# Patient Record
Sex: FEMALE | Race: WHITE | NOT HISPANIC OR LATINO | Employment: STUDENT | URBAN - METROPOLITAN AREA
[De-identification: names, ages, dates, MRNs, and addresses within clinical notes are randomized per-mention and may not be internally consistent; named-entity substitution may affect disease eponyms.]

---

## 2017-03-27 ENCOUNTER — OFFICE VISIT (OUTPATIENT)
Dept: OBGYN | Age: 22
End: 2017-03-27

## 2017-03-27 VITALS — WEIGHT: 113.54 LBS | DIASTOLIC BLOOD PRESSURE: 70 MMHG | SYSTOLIC BLOOD PRESSURE: 88 MMHG

## 2017-03-27 DIAGNOSIS — Z30.430 ENCOUNTER FOR IUD INSERTION: ICD-10-CM

## 2017-03-27 DIAGNOSIS — Z11.3 SCREEN FOR STD (SEXUALLY TRANSMITTED DISEASE): ICD-10-CM

## 2017-03-27 DIAGNOSIS — Z01.812 PRE-PROCEDURE LAB EXAM: Primary | ICD-10-CM

## 2017-03-27 LAB
B-HCG UR QL: NORMAL
SP GR UR STRIP: NEGATIVE

## 2017-03-27 PROCEDURE — 87491 CHLMYD TRACH DNA AMP PROBE: CPT | Performed by: INTERNAL MEDICINE

## 2017-03-27 PROCEDURE — 58300 INSERT INTRAUTERINE DEVICE: CPT | Performed by: OBSTETRICS & GYNECOLOGY

## 2017-03-27 PROCEDURE — 81025 URINE PREGNANCY TEST: CPT | Performed by: OBSTETRICS & GYNECOLOGY

## 2017-03-27 PROCEDURE — 87591 N.GONORRHOEAE DNA AMP PROB: CPT | Performed by: INTERNAL MEDICINE

## 2017-03-28 ENCOUNTER — E-ADVICE (OUTPATIENT)
Dept: OBGYN | Age: 22
End: 2017-03-28

## 2017-03-28 LAB
C TRACH RRNA SPEC QL NAA+PROBE: NEGATIVE
N GONORRHOEA RRNA SPEC QL NAA+PROBE: NEGATIVE
SPECIMEN SOURCE: NORMAL

## 2017-03-31 ENCOUNTER — E-ADVICE (OUTPATIENT)
Dept: OBGYN | Age: 22
End: 2017-03-31

## 2017-03-31 ENCOUNTER — TELEPHONE (OUTPATIENT)
Dept: OBGYN | Age: 22
End: 2017-03-31

## 2017-04-03 ENCOUNTER — OFFICE VISIT (OUTPATIENT)
Dept: OBGYN | Age: 22
End: 2017-04-03

## 2017-04-03 VITALS
BODY MASS INDEX: 18.92 KG/M2 | HEIGHT: 64 IN | SYSTOLIC BLOOD PRESSURE: 110 MMHG | HEART RATE: 88 BPM | WEIGHT: 110.8 LBS | DIASTOLIC BLOOD PRESSURE: 68 MMHG

## 2017-04-03 DIAGNOSIS — L50.9 HIVES: ICD-10-CM

## 2017-04-03 DIAGNOSIS — L50.9 URTICARIA: ICD-10-CM

## 2017-04-03 DIAGNOSIS — Z30.431 IUD CHECK UP: Primary | ICD-10-CM

## 2017-04-03 PROCEDURE — 99213 OFFICE O/P EST LOW 20 MIN: CPT | Performed by: OBSTETRICS & GYNECOLOGY

## 2017-04-03 RX ORDER — METHYLPREDNISOLONE 4 MG/1
TABLET ORAL
Qty: 21 TABLET | Refills: 0 | Status: SHIPPED | OUTPATIENT
Start: 2017-04-03

## 2017-06-12 ENCOUNTER — TELEPHONE (OUTPATIENT)
Dept: ALLERGY | Age: 22
End: 2017-06-12

## 2017-06-20 ENCOUNTER — LAB SERVICES (OUTPATIENT)
Dept: LAB | Age: 22
End: 2017-06-20

## 2017-06-20 ENCOUNTER — OFFICE VISIT (OUTPATIENT)
Dept: ALLERGY | Age: 22
End: 2017-06-20
Attending: OBSTETRICS & GYNECOLOGY

## 2017-06-20 VITALS
RESPIRATION RATE: 14 BRPM | HEIGHT: 64 IN | HEART RATE: 88 BPM | SYSTOLIC BLOOD PRESSURE: 106 MMHG | BODY MASS INDEX: 17.93 KG/M2 | DIASTOLIC BLOOD PRESSURE: 62 MMHG | WEIGHT: 105 LBS

## 2017-06-20 DIAGNOSIS — L50.9 URTICARIA: ICD-10-CM

## 2017-06-20 DIAGNOSIS — L20.89 OTHER ATOPIC DERMATITIS: ICD-10-CM

## 2017-06-20 DIAGNOSIS — Z91.018 FOOD ALLERGY: ICD-10-CM

## 2017-06-20 DIAGNOSIS — L50.8 CHRONIC URTICARIA: ICD-10-CM

## 2017-06-20 DIAGNOSIS — L50.9 URTICARIA: Primary | ICD-10-CM

## 2017-06-20 DIAGNOSIS — L30.9 MODERATE ECZEMA: ICD-10-CM

## 2017-06-20 DIAGNOSIS — J30.81 ALLERGIC RHINITIS DUE TO ANIMAL (CAT) (DOG) HAIR AND DANDER: ICD-10-CM

## 2017-06-20 PROCEDURE — 86003 ALLG SPEC IGE CRUDE XTRC EA: CPT | Performed by: INTERNAL MEDICINE

## 2017-06-20 PROCEDURE — 99204 OFFICE O/P NEW MOD 45 MIN: CPT | Performed by: ALLERGY & IMMUNOLOGY

## 2017-06-20 PROCEDURE — 95004 PERQ TESTS W/ALRGNC XTRCS: CPT | Performed by: ALLERGY & IMMUNOLOGY

## 2017-06-20 PROCEDURE — 36415 COLL VENOUS BLD VENIPUNCTURE: CPT | Performed by: INTERNAL MEDICINE

## 2017-06-20 PROCEDURE — 83520 IMMUNOASSAY QUANT NOS NONAB: CPT | Performed by: INTERNAL MEDICINE

## 2017-06-20 RX ORDER — PIMECROLIMUS 10 MG/G
CREAM TOPICAL
Qty: 30 G | Refills: 0 | Status: SHIPPED | OUTPATIENT
Start: 2017-06-20 | End: 2017-10-27

## 2017-06-20 RX ORDER — COPPER 313.4 MG/1
INTRAUTERINE DEVICE INTRAUTERINE
COMMUNITY

## 2017-06-22 LAB
COW MILK IGE QN: <0.35 KU/L
COW WHEY IGE QN: <0.35 KU/L (ref 0–0.35)
DEPRECATED COW MILK IGE RAST QL: 0
DEPRECATED COW WHEY IGE RAST QL: 0
DEPRECATED GLUTEN IGE RAST QL: 0
DEPRECATED SOYBEAN IGE RAST QL: 0
DEPRECATED WHEAT IGE RAST QL: 0
GLUTEN IGE QN: <0.35 KU/L
SOYBEAN IGE QN: <0.35 KU/L
WHEAT IGE QN: <0.35 KU/L

## 2017-06-23 LAB
TRYPTASE SERPL-MCNC: 4.5 UG/L
URTICARIA-INDUCED BASOPHIL ACTIVATION (UIBA): 11

## 2017-07-05 ENCOUNTER — TELEPHONE (OUTPATIENT)
Dept: ALLERGY | Age: 22
End: 2017-07-05

## 2017-08-16 ENCOUNTER — E-ADVICE (OUTPATIENT)
Dept: FAMILY MEDICINE | Age: 22
End: 2017-08-16

## 2017-08-16 RX ORDER — CIPROFLOXACIN 500 MG/1
500 TABLET, FILM COATED ORAL EVERY 12 HOURS
Qty: 6 TABLET | Refills: 0 | Status: SHIPPED | OUTPATIENT
Start: 2017-08-16 | End: 2017-08-19

## 2017-08-18 ENCOUNTER — NURSE ONLY (OUTPATIENT)
Dept: FAMILY MEDICINE | Age: 22
End: 2017-08-18

## 2017-08-18 DIAGNOSIS — Z23 NEED FOR VACCINATION: Primary | ICD-10-CM

## 2017-08-18 PROCEDURE — 90636 HEP A/HEP B VACC ADULT IM: CPT | Performed by: NURSE PRACTITIONER

## 2017-08-18 PROCEDURE — 90471 IMMUNIZATION ADMIN: CPT | Performed by: NURSE PRACTITIONER

## 2017-10-13 ENCOUNTER — E-ADVICE (OUTPATIENT)
Dept: OBGYN | Age: 22
End: 2017-10-13

## 2017-10-27 ENCOUNTER — OFFICE VISIT (OUTPATIENT)
Dept: OBGYN | Age: 22
End: 2017-10-27

## 2017-10-27 VITALS
DIASTOLIC BLOOD PRESSURE: 56 MMHG | HEART RATE: 91 BPM | HEIGHT: 64 IN | BODY MASS INDEX: 19.2 KG/M2 | SYSTOLIC BLOOD PRESSURE: 100 MMHG | WEIGHT: 112.43 LBS

## 2017-10-27 DIAGNOSIS — Z30.432 ENCOUNTER FOR IUD REMOVAL: Primary | ICD-10-CM

## 2017-10-27 PROCEDURE — 58301 REMOVE INTRAUTERINE DEVICE: CPT | Performed by: OBSTETRICS & GYNECOLOGY

## 2017-11-29 ENCOUNTER — LAB SERVICES (OUTPATIENT)
Dept: LAB | Age: 22
End: 2017-11-29

## 2017-11-29 ENCOUNTER — OFFICE VISIT (OUTPATIENT)
Dept: FAMILY MEDICINE | Age: 22
End: 2017-11-29

## 2017-11-29 VITALS
DIASTOLIC BLOOD PRESSURE: 60 MMHG | RESPIRATION RATE: 20 BRPM | WEIGHT: 109 LBS | TEMPERATURE: 98.6 F | BODY MASS INDEX: 19.01 KG/M2 | OXYGEN SATURATION: 96 % | HEART RATE: 76 BPM | SYSTOLIC BLOOD PRESSURE: 90 MMHG

## 2017-11-29 DIAGNOSIS — R53.83 FATIGUE, UNSPECIFIED TYPE: ICD-10-CM

## 2017-11-29 DIAGNOSIS — R21 RASH: Primary | ICD-10-CM

## 2017-11-29 DIAGNOSIS — R21 RASH: ICD-10-CM

## 2017-11-29 DIAGNOSIS — R63.1 POLYDIPSIA: ICD-10-CM

## 2017-11-29 PROCEDURE — 80050 GENERAL HEALTH PANEL: CPT | Performed by: INTERNAL MEDICINE

## 2017-11-29 PROCEDURE — 99213 OFFICE O/P EST LOW 20 MIN: CPT | Performed by: NURSE PRACTITIONER

## 2017-11-29 PROCEDURE — 36415 COLL VENOUS BLD VENIPUNCTURE: CPT | Performed by: FAMILY MEDICINE

## 2017-11-29 PROCEDURE — 84439 ASSAY OF FREE THYROXINE: CPT | Performed by: INTERNAL MEDICINE

## 2017-11-29 RX ORDER — DEXTROAMPHETAMINE SACCHARATE, AMPHETAMINE ASPARTATE, DEXTROAMPHETAMINE SULFATE AND AMPHETAMINE SULFATE 5; 5; 5; 5 MG/1; MG/1; MG/1; MG/1
40 TABLET ORAL DAILY
COMMUNITY

## 2017-11-29 RX ORDER — AMOXICILLIN AND CLAVULANATE POTASSIUM 875; 125 MG/1; MG/1
1 TABLET, FILM COATED ORAL EVERY 12 HOURS
Qty: 20 TABLET | Refills: 0 | Status: SHIPPED | OUTPATIENT
Start: 2017-11-29

## 2017-11-30 ENCOUNTER — TELEPHONE (OUTPATIENT)
Dept: FAMILY MEDICINE | Age: 22
End: 2017-11-30

## 2017-11-30 DIAGNOSIS — Z13.29 SCREENING FOR THYROID DISORDER: Primary | ICD-10-CM

## 2017-11-30 LAB
ALBUMIN SERPL-MCNC: 3.8 G/DL (ref 3.6–5.1)
ALBUMIN/GLOB SERPL: 1.1 {RATIO} (ref 1–2.4)
ALP SERPL-CCNC: 80 UNITS/L (ref 45–117)
ALT SERPL-CCNC: 22 UNITS/L
ANION GAP SERPL CALC-SCNC: 13 MMOL/L (ref 10–20)
AST SERPL-CCNC: 22 UNITS/L
BASOPHILS # BLD AUTO: 0 K/MCL (ref 0–0.3)
BASOPHILS NFR BLD AUTO: 0 %
BILIRUB SERPL-MCNC: 0.3 MG/DL (ref 0.2–1)
BUN SERPL-MCNC: 13 MG/DL (ref 6–20)
BUN/CREAT SERPL: 20 (ref 7–25)
CALCIUM SERPL-MCNC: 9.6 MG/DL (ref 8.4–10.2)
CHLORIDE SERPL-SCNC: 104 MMOL/L (ref 98–107)
CO2 SERPL-SCNC: 28 MMOL/L (ref 21–32)
CREAT SERPL-MCNC: 0.64 MG/DL (ref 0.51–0.95)
DIFFERENTIAL METHOD BLD: NORMAL
EOSINOPHIL # BLD AUTO: 0.4 K/MCL (ref 0.1–0.5)
EOSINOPHIL NFR SPEC: 4 %
ERYTHROCYTE [DISTWIDTH] IN BLOOD: 12.5 % (ref 11–15)
FASTING STATUS PATIENT QL REPORTED: NORMAL HRS
GLOBULIN SER-MCNC: 3.5 G/DL (ref 2–4)
GLUCOSE SERPL-MCNC: 80 MG/DL (ref 65–99)
HCT VFR BLD CALC: 40.1 % (ref 36–46.5)
HGB BLD-MCNC: 13.7 G/DL (ref 12–15.5)
LYMPHOCYTES # BLD MANUAL: 1.7 K/MCL (ref 1–4.8)
LYMPHOCYTES NFR BLD MANUAL: 20 %
MCH RBC QN AUTO: 31.4 PG (ref 26–34)
MCHC RBC AUTO-ENTMCNC: 34.2 G/DL (ref 32–36.5)
MCV RBC AUTO: 91.8 FL (ref 78–100)
MONOCYTES # BLD MANUAL: 0.8 K/MCL (ref 0.3–0.9)
MONOCYTES NFR BLD MANUAL: 10 %
NEUTROPHILS # BLD: 5.4 K/MCL (ref 1.8–7.7)
NEUTROPHILS NFR BLD AUTO: 66 %
PLATELET # BLD: 349 K/MCL (ref 140–450)
POTASSIUM SERPL-SCNC: 4 MMOL/L (ref 3.4–5.1)
PROT SERPL-MCNC: 7.3 G/DL (ref 6.4–8.2)
RBC # BLD: 4.37 MIL/MCL (ref 4–5.2)
SODIUM SERPL-SCNC: 141 MMOL/L (ref 135–145)
T4 FREE SERPL-MCNC: 1.6 NG/DL (ref 0.8–1.5)
TSH SERPL-ACNC: 0.83 MCUNITS/ML (ref 0.35–5)
WBC # BLD: 8.3 K/MCL (ref 4.2–11)

## 2017-12-13 ENCOUNTER — E-ADVICE (OUTPATIENT)
Dept: FAMILY MEDICINE | Age: 22
End: 2017-12-13

## 2018-02-05 ENCOUNTER — TELEPHONE (OUTPATIENT)
Dept: FAMILY MEDICINE | Age: 23
End: 2018-02-05

## 2018-03-29 ENCOUNTER — E-ADVICE (OUTPATIENT)
Dept: FAMILY MEDICINE | Age: 23
End: 2018-03-29

## 2018-05-08 ENCOUNTER — TELEPHONE (OUTPATIENT)
Dept: FAMILY MEDICINE | Age: 23
End: 2018-05-08

## 2018-08-02 ENCOUNTER — TELEPHONE (OUTPATIENT)
Dept: FAMILY MEDICINE | Age: 23
End: 2018-08-02

## 2018-10-02 ENCOUNTER — OFFICE VISIT (OUTPATIENT)
Dept: ALLERGY | Age: 23
End: 2018-10-02

## 2018-10-02 VITALS
TEMPERATURE: 98 F | SYSTOLIC BLOOD PRESSURE: 108 MMHG | WEIGHT: 116.4 LBS | HEART RATE: 90 BPM | BODY MASS INDEX: 19.87 KG/M2 | HEIGHT: 64 IN | DIASTOLIC BLOOD PRESSURE: 70 MMHG

## 2018-10-02 DIAGNOSIS — L30.9 MODERATE ECZEMA: ICD-10-CM

## 2018-10-02 DIAGNOSIS — L50.8 CHRONIC URTICARIA: Primary | ICD-10-CM

## 2018-10-02 DIAGNOSIS — J30.81 ALLERGIC RHINITIS DUE TO ANIMAL (CAT) (DOG) HAIR AND DANDER: ICD-10-CM

## 2018-12-16 ENCOUNTER — WALK IN (OUTPATIENT)
Dept: URGENT CARE | Age: 23
End: 2018-12-16

## 2018-12-16 ENCOUNTER — NURSE TRIAGE (OUTPATIENT)
Dept: TELEHEALTH | Age: 23
End: 2018-12-16

## 2018-12-16 VITALS
TEMPERATURE: 98.4 F | SYSTOLIC BLOOD PRESSURE: 102 MMHG | OXYGEN SATURATION: 99 % | WEIGHT: 122.58 LBS | HEART RATE: 88 BPM | RESPIRATION RATE: 16 BRPM | DIASTOLIC BLOOD PRESSURE: 60 MMHG | BODY MASS INDEX: 20.93 KG/M2 | HEIGHT: 64 IN

## 2018-12-16 DIAGNOSIS — L30.9 ECZEMA, UNSPECIFIED TYPE: ICD-10-CM

## 2018-12-16 DIAGNOSIS — K60.2 FISSURE, ANAL: Primary | ICD-10-CM

## 2018-12-16 PROCEDURE — 99214 OFFICE O/P EST MOD 30 MIN: CPT | Performed by: FAMILY MEDICINE

## 2018-12-16 RX ORDER — LIDOCAINE 5 G/100G
CREAM RECTAL; TOPICAL PRN
Qty: 30 G | Refills: 0 | Status: SHIPPED | OUTPATIENT
Start: 2018-12-16

## 2018-12-16 RX ORDER — CLINDAMYCIN HYDROCHLORIDE 300 MG/1
300 CAPSULE ORAL 4 TIMES DAILY
Qty: 40 CAPSULE | Refills: 0 | Status: SHIPPED | OUTPATIENT
Start: 2018-12-16

## 2018-12-16 RX ORDER — TRIAMCINOLONE ACETONIDE OINTMENT USP, 0.05% 0.5 MG/G
OINTMENT TOPICAL
Qty: 1 TUBE | Refills: 0 | Status: SHIPPED | OUTPATIENT
Start: 2018-12-16

## 2018-12-16 RX ORDER — ALPRAZOLAM 0.5 MG/1
0.5 TABLET ORAL NIGHTLY PRN
COMMUNITY

## 2018-12-17 ENCOUNTER — TELEPHONE (OUTPATIENT)
Dept: SURGERY | Age: 23
End: 2018-12-17

## 2018-12-26 ENCOUNTER — APPOINTMENT (OUTPATIENT)
Dept: SURGERY | Age: 23
End: 2018-12-26
Attending: FAMILY MEDICINE

## 2019-02-04 ENCOUNTER — TELEPHONE (OUTPATIENT)
Dept: FAMILY MEDICINE | Age: 24
End: 2019-02-04

## 2019-02-04 RX ORDER — CETIRIZINE HYDROCHLORIDE 10 MG/1
10 TABLET ORAL
COMMUNITY
Start: 2018-08-01

## 2019-05-17 ENCOUNTER — E-ADVICE (OUTPATIENT)
Dept: OBGYN | Age: 24
End: 2019-05-17

## 2019-08-20 ENCOUNTER — TELEPHONE (OUTPATIENT)
Dept: FAMILY MEDICINE | Age: 24
End: 2019-08-20

## 2019-10-15 ENCOUNTER — TELEPHONE (OUTPATIENT)
Dept: FAMILY MEDICINE | Age: 24
End: 2019-10-15

## 2019-10-24 ENCOUNTER — TRANSFERRED RECORDS (OUTPATIENT)
Dept: HEALTH INFORMATION MANAGEMENT | Facility: CLINIC | Age: 24
End: 2019-10-24

## 2019-11-01 NOTE — TELEPHONE ENCOUNTER
FUTURE VISIT INFORMATION      FUTURE VISIT INFORMATION:    Date: 11.15.2019    Time: 12:00 P    Location: [facility]  REFERRAL INFORMATION:    Referring provider:  Lisseth Dunlap     Referring providers clinic:  [facility]    Reason for visit/diagnosis Patch testing consult, atopic dermatitis    RECORDS REQUESTED FROM:       Clinic name Comments Records Status Imaging Status   [facility] 10.24.19 Dr. Lisseth Dunlap Epic                                    Action    Action Taken 11.01.2019 Pending for referral and recs.

## 2019-11-15 ENCOUNTER — OFFICE VISIT (OUTPATIENT)
Dept: ALLERGY | Facility: CLINIC | Age: 24
End: 2019-11-15
Payer: COMMERCIAL

## 2019-11-15 ENCOUNTER — PRE VISIT (OUTPATIENT)
Dept: ALLERGY | Facility: CLINIC | Age: 24
End: 2019-11-15

## 2019-11-15 DIAGNOSIS — L23.89 ALLERGIC CONTACT DERMATITIS DUE TO OTHER AGENTS: ICD-10-CM

## 2019-11-15 DIAGNOSIS — H10.10 ALLERGIC RHINOCONJUNCTIVITIS: ICD-10-CM

## 2019-11-15 DIAGNOSIS — J30.9 ALLERGIC RHINOCONJUNCTIVITIS: ICD-10-CM

## 2019-11-15 DIAGNOSIS — Z88.9 ATOPY: Primary | ICD-10-CM

## 2019-11-15 ASSESSMENT — PAIN SCALES - GENERAL: PAINLEVEL: NO PAIN (0)

## 2019-11-15 NOTE — LETTER
11/15/2019         RE: Sayra Atkinson  [address]  [facility] [address]        Dear Colleague,    Thank you for referring your patient, Sayra Atkinson, to the [facility] ALLERGY. Please see a copy of my visit note below.    [facility] Allergy Note    Allergy Clinic  [facility]  [address] [place] [id number]    Encounter Date: Nov 15, 2019    CC:  Chief Complaint   Patient presents with     Allergies     Referral for patch testing.        History of Present Illness:  Ms. Sayra Atkinson is a 24 year old female who presents today for consultation for rash. The patient was referred by Lisseth Dunlap of [facility]. Pt reports that she has had eczema since childhood at about 4 years old. Mostly it appears as a rash on the face and upper extremities. Notes that it's also on the scalp. Worsened with a copper IUD placement about 3-4 years ago. Noted that the IUD was pure copper. She removed it. Then during the fall and while in large cities, her eczema will become quite severe.    She has exercise-induced asthma. Notes a family Hx of asthma. Was previously prescribed an inhaler which she hasn't had to use.  Reports having rhinitis with runny nose and red eyes when exposed to dogs, cats, and hamsters.    Admits to seasonal allergies to pollen worse in the fall and spring. Also states that she may be allergic to mold due to experiencing symptoms when visiting her parent's house which is quite old.    Had prick test done about 2-3 years ago and it was all negative. She uses sunscreen most of the time in the summer.     Notes she was prescribed dupixent about a month ago but still doesn't have it.    The patient showed me pictures from 2 years ago where there was a clear eczema in the decolletage, well-demarcated, on the neck, face and mostly around the eyes, and a little bit around the upper extremities, but really  almost nothing on the lower trunk.     She takes Adderall and xanax for school. Takes also multivitamins, probiotics, zinc, Chased Crow . Occasionally gets steroids injected to the neck and back. Takes antihistamines when she visits her parents in [place]. Denies having done IgA for gluten.    Pt is a  at the [employer]. 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 No hobbies.      Past Medical History:   There is no problem list on file for this patient.    No past medical history on file.  No past surgical history on file.    Social History:  Pt is a  at the [employer]. 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 No hobbies.    Family History:  No family history on file.    Medications:  No current outpatient medications on file.       Allergies not on file    Review of Systems:  -As per HPI  -Constitutional: Otherwise feeling well today, in usual state of health.  -Skin: As above in HPI. No additional skin concerns.    Physical exam:  Vitals: There were no vitals taken for this visit.  GEN: This is a well developed, well-nourished female in no acute distress, in a pleasant mood.    SKIN: Focused examination of the face and neck were performed.  - Face and neck clear.  -The patient showed pictures from 2 years ago where there was a clear eczema in the decolletage, well-demarcated, on the neck, face and mostly around the eyes, and a little bit around the upper extremities, but really almost nothing on the lower trunk.   -No other lesions of concern on areas examined.     Order for PATCH TESTS    [] Outpatient  [] Inpatient: [facility]..../ Bed ....      Skin Atopy (atopic dermatitis) [x] Yes   [] No  Rhinitis/Sinusitis:   [x] Yes   [] No  Allergic Asthma:   [x] Yes   [] No  Food Allergy:   [] Yes   [x] No no gluten (vomit), soy (throat closes, nauseous), quinoa (bloating)  Leg ulcers:   [] Yes   [x] No  Hand eczema:   [] Yes   [x] No   Leading hand:   [] R   [] L       []  Ambidextrous                      Reason for tests (suspected allergy): Recurrent eczemas mostly upper trunk and face and scalp  Known previous allergies: maybe metals, cobalt? Nickel?    Standardized panels  [x] Standard panel (40 tests)  [x] Preservatives & Antimicrobials (31 tests)  [x] Emulsifiers & Additives (25 tests)   [x] Perfumes/Flavours & Plants (25 tests)  [] Hairdresser panel (12 tests)  [] Rubber Chemicals (22 tests)  [] Plastics (26 tests)  [] Colorants/Dyes/Food additives (20 tests)  [] Metals (implants/dental) (24 tests)  [] Local anaesthetics/NSAIDs (13 tests)  [] Antibiotics & Antimycotics (14 tests)   [] Corticosteroids (15 tests)   [] Photopatch test (62 tests)   [] others: ...      [] Patient's own products: ...    DO NOT test if chemical or biological identity is unknown!     always ask from patient the product information and safety sheets (MSDS)   [x] Atopy screen prick test (Atopic predisposition): also add soy and wheat. Also intradermal test if everything is negative (immediate and delayed) to molds    [] Patient needs consultation with Allergy team (changes of tests may apply)  [] Tests discussed with Allergy team (can have direct appointment for patch tests)      Diagnosis:    >> Atopic predisposition with     atopic dermatitis since childhood    suspicion for allergic rhino-conjunctivitis    exercise-induced asthma with sensitization to animal dander, pollens, dust mites, molds?    >> Flare-ups of eczemas on face, scalp, wisam-orbital neck and decolletage and arms  DDx airborne contact dermatitis, photodermatitis? (more in winter), or atopic dermatitis with delayed type reactions to dust mites, molds or pollens      Treatment:    Plan for patch tests (see above)    Atopy screen prick test and Intradermal tests (molds and HDM)    CC Lisseth Dunlap MD  [facility]  [address]  [place] [address] on close of this encounter.    Follow-up for patch  testing.      Staff Involved:    Scribe Disclosure  I, Kathy Espinosa, am serving as a scribe to document services personally performed by Dr. Kranthi Johnson, based on data collection and the provider's statements to me.     I spent a total of 25 minutes face to face with Sayra Atkinson during today s office visit. Over 50% of this time was spent counseling the patient and/or coordinating care. Please see Assessment and Plan for details.        Again, thank you for allowing me to participate in the care of your patient.        Sincerely,        Kranthi Johnson MD

## 2019-11-15 NOTE — PROGRESS NOTES
[facility] Allergy Note    Allergy Clinic  [facility]  [address] [place] [address]    Encounter Date: Nov 15, 2019    CC:  Chief Complaint   Patient presents with     Allergies     Referral for patch testing.        History of Present Illness:  Ms. Sayra Atkinson is a 24 year old female who presents today for consultation for rash. The patient was referred by Lisseth Dunlap of [facility]. Pt reports that she has had eczema since childhood at about 4 years old. Mostly it appears as a rash on the face and upper extremities. Notes that it's also on the scalp. Worsened with a copper IUD placement about 3-4 years ago. Noted that the IUD was pure copper. She removed it. Then during the fall and while in large cities, her eczema will become quite severe.    She has exercise-induced asthma. Notes a family Hx of asthma. Was previously prescribed an inhaler which she hasn't had to use.  Reports having rhinitis with runny nose and red eyes when exposed to dogs, cats, and hamsters.    Admits to seasonal allergies to pollen worse in the fall and spring. Also states that she may be allergic to mold due to experiencing symptoms when visiting her parent's house which is quite old.    Had prick test done about 2-3 years ago and it was all negative. She uses sunscreen most of the time in the summer.     Notes she was prescribed dupixent about a month ago but still doesn't have it.    The patient showed me pictures from 2 years ago where there was a clear eczema in the decolletage, well-demarcated, on the neck, face and mostly around the eyes, and a little bit around the upper extremities, but really almost nothing on the lower trunk.     She takes Adderall and xanax for school. Takes also multivitamins, probiotics, zinc, Chased Crow . Occasionally gets steroids injected to the neck and back. Takes antihistamines when she visits her  parents in [place]. Denies having done IgA for gluten.    Pt is a  at the [employer]. 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 No hobbies.      Past Medical History:   There is no problem list on file for this patient.    No past medical history on file.  No past surgical history on file.    Social History:  Pt is a  at the [employer]. 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 No hobbies.    Family History:  No family history on file.    Medications:  No current outpatient medications on file.       Allergies not on file    Review of Systems:  -As per HPI  -Constitutional: Otherwise feeling well today, in usual state of health.  -Skin: As above in HPI. No additional skin concerns.    Physical exam:  Vitals: There were no vitals taken for this visit.  GEN: This is a well developed, well-nourished female in no acute distress, in a pleasant mood.    SKIN: Focused examination of the face and neck were performed.  - Face and neck clear.  -The patient showed pictures from 2 years ago where there was a clear eczema in the decolletage, well-demarcated, on the neck, face and mostly around the eyes, and a little bit around the upper extremities, but really almost nothing on the lower trunk.   -No other lesions of concern on areas examined.     Order for PATCH TESTS    [] Outpatient  [] Inpatient: [facility]..../ Bed ....      Skin Atopy (atopic dermatitis) [x] Yes   [] No  Rhinitis/Sinusitis:   [x] Yes   [] No  Allergic Asthma:   [x] Yes   [] No  Food Allergy:   [] Yes   [x] No no gluten (vomit), soy (throat closes, nauseous), quinoa (bloating)  Leg ulcers:   [] Yes   [x] No  Hand eczema:   [] Yes   [x] No   Leading hand:   [] R   [] L       [] Ambidextrous                      Reason for tests (suspected allergy): Recurrent eczemas mostly upper trunk and face and scalp  Known previous allergies: maybe metals, cobalt? Nickel?    Standardized panels  [x] Standard panel (40  tests)  [x] Preservatives & Antimicrobials (31 tests)  [x] Emulsifiers & Additives (25 tests)   [x] Perfumes/Flavours & Plants (25 tests)  [] Hairdresser panel (12 tests)  [] Rubber Chemicals (22 tests)  [] Plastics (26 tests)  [] Colorants/Dyes/Food additives (20 tests)  [] Metals (implants/dental) (24 tests)  [] Local anaesthetics/NSAIDs (13 tests)  [] Antibiotics & Antimycotics (14 tests)   [] Corticosteroids (15 tests)   [] Photopatch test (62 tests)   [] others: ...      [] Patient's own products: ...    DO NOT test if chemical or biological identity is unknown!     always ask from patient the product information and safety sheets (MSDS)   [x] Atopy screen prick test (Atopic predisposition): also add soy and wheat. Also intradermal test if everything is negative (immediate and delayed) to molds    [] Patient needs consultation with Allergy team (changes of tests may apply)  [] Tests discussed with Allergy team (can have direct appointment for patch tests)      Diagnosis:    >> Atopic predisposition with     atopic dermatitis since childhood    suspicion for allergic rhino-conjunctivitis    exercise-induced asthma with sensitization to animal dander, pollens, dust mites, molds?    >> Flare-ups of eczemas on face, scalp, wisam-orbital neck and decolletage and arms  DDx airborne contact dermatitis, photodermatitis? (more in winter), or atopic dermatitis with delayed type reactions to dust mites, molds or pollens      Treatment:    Plan for patch tests (see above)    Atopy screen prick test and Intradermal tests (molds and HDM)    CC Lisseth Dunlap MD  [facility]  [address]  [place] [address] on close of this encounter.    Follow-up for patch testing.      Staff Involved:    Scribe Disclosure  I, Kathy Espinosa, am serving as a scribe to document services personally performed by Dr. Kranthi Johnson, based on data collection and the provider's statements to me.     I spent a  total of 25 minutes face to face with Sayra Atkinson during today s office visit. Over 50% of this time was spent counseling the patient and/or coordinating care. Please see Assessment and Plan for details.

## 2019-11-15 NOTE — NURSING NOTE
"Chief Complaint   Patient presents with     Allergies     contact dermatitis, patient wants patch testing. Referred by dermatologist.  Patient's allergies  are based on symptoms, not testing. Patient had prick testing 2 years ago and it was negative stating \" a waste of time and money.\"  Patient states she has food sensitivities as well. Patient is not taking an antihistamine at this time.    .   Kaela Link LPN        "

## 2019-11-15 NOTE — PATIENT INSTRUCTIONS
Patch Testing Information  What are allergen patch tests?    The test is done to look for skin allergies that may be causing rashes and irritation.    A patch test is a way of identifying whether a substance has caused a delayed reaction with skin inflammation, such as contact eczema or delayed (after days) reactions to drugs.     We will use various types of test compounds, which may include common allergens you may come in contact with in daily life such as preservatives, fragrances or even drugs.     Most of the time we will use standardized, prefabricated test solutions. The choice of the substances and series tested will depend on your history of reactions. Sometimes we will test your own products as well.     In order to avoid severe toxic reactions we need detailed information or safety sheets for each of the test compounds.    The test panels are set up with a small amount of common substances that cause skin allergies. They are taped to your skin with a clear hypoallergenic bandage and reinforced hypoallergenic tape.    The substances are numbered, so it is easy to tell what is causing a skin reaction.  What do I need to do in preparation for the test?    Stop all systemic steroids 1 month prior to the testing.     Stop applying topical steroids to the test area one week prior to the test. It is to use them elsewhere throughout the testing process. If this is not possible then discuss options with the Allergy specialist.    Do not go sunbathing or tanning for one week prior to testing    It is okay to take antihistamines as normal.     Please wear dark colors the week of the test since we will write on you with a dark marker that may transfer and stain clothing or bedding.     Some medications can affect the reactions to allergens during the tests. Therefore reveal all the medications you take to the allergy team. The doctor will discuss the medications with you before the tests.     Eat how you normally  would.    Avoid the following:    You cannot get the test area wet during the entire test period. This means no bathing or swimming the entire test period.    No strenuous exercise that may cause sweating.    Do not scratch the test area, this can cause the allergen to spread and give us false positives.     Avoid exposure to UV irradiation. This means no tanning or UV treatment during the testing period.   What can I expect?    Patch testing is done over three appointments.   o The usual schedule is: Monday (Allergen patches are placed), Wednesday (Patches are removed and skin is examined by the MD), and Friday (Skin is examined by the MD)      If you are allergic, there will be an area of irritation where the test was placed.    Itching or burning at the test site might happen if you are allergic to the allergen.  Do not rub or scratch the test site since this may spread the allergen and possibly cause false positives. If itching or burning is not tolerable please contact the clinic.    The marker we draw on your back with ma take up to 5 weeks to wash off completely. Rubbing alcohol can help speed up this process.     Reactions can occur 1 to 2 weeks after the tests are applied. If this happens please take photos of the area and contact the clinic.  What should I do after the tests are placed?    Keep the area dry. No showering or getting the test area wet from the time we see you at your first visit until after your third appointment. If you get the test area wet you are washing off the test and we could get false negative reactions.    If you notice any of the test patches coming loose put on more tape to re-secure the test.    If the marker that is applied fades you can use a dark pen to noam around the panel sites.    Cover the test area when you are outside to avoid any sun exposure while the test is in place.     You can remove the tests only if there is a severe reaction (itch, pain, blistering). Please  report this to your doctor immediately. If you have to remove the tests please noam the locations of each test field with a grid so we can identify the allergen.  WHAT ARE THE POSSIBLE RISKS OF PATCH TESTS     If you are allergic to a compound tested you will develop a localized skin reaction similar to your previous reaction, this may take days to develop. These reactions include a formation of red, itchy skin lesions that could be about a centimeter with small vesicles or possibly even blisters. The lesions will fade over time, this may take weeks. The test might leave some skin pigmentation for a few months.     In rare cases a localized reaction to patch testing can become generalized.     The tests with your own products might have some risks because they are not standardized and unanticipated reactions could occur. We need as much information as possible to evaluate if your own products are safe to test and under what conditions. This has to be evaluated for each individual product.   Useful Contact Information    To contact your doctor you can either send a [employer] message or call [phone]    Address  o [address] [place] [address], [address]    If you develop any serious or adverse allergic reaction after office hours please seek immediate medical assistance at the nearest clinic, urgent care, or emergency room.

## 2019-12-02 ENCOUNTER — PRE VISIT (OUTPATIENT)
Dept: ALLERGY | Facility: CLINIC | Age: 24
End: 2019-12-02

## 2019-12-02 ENCOUNTER — OFFICE VISIT (OUTPATIENT)
Dept: ALLERGY | Facility: CLINIC | Age: 24
End: 2019-12-02
Payer: COMMERCIAL

## 2019-12-02 DIAGNOSIS — L23.89 ALLERGIC CONTACT DERMATITIS DUE TO OTHER AGENTS: Primary | ICD-10-CM

## 2019-12-02 DIAGNOSIS — Z88.9 ATOPY: ICD-10-CM

## 2019-12-02 DIAGNOSIS — J30.9 ALLERGIC RHINOCONJUNCTIVITIS: ICD-10-CM

## 2019-12-02 DIAGNOSIS — H10.10 ALLERGIC RHINOCONJUNCTIVITIS: ICD-10-CM

## 2019-12-02 ASSESSMENT — PAIN SCALES - GENERAL: PAINLEVEL: NO PAIN (0)

## 2019-12-02 NOTE — PROGRESS NOTES
[facility] Allergy Note    Allergy Clinic  [facility]  [address] [place] [address]    Encounter Date: Dec 2, 2019    CC:  Chief Complaint   Patient presents with     Allergy Testing Followup     Sayra is here for allergy testing day 1       History of Present Illness:  Ms. Sayra Atkinson is a 24 year old female who presents today for consultation for rash. The patient was referred by Lisseth Dunlap of [facility]. Pt reports that she has had eczema since childhood at about 4 years old. Mostly it appears as a rash on the face and upper extremities. Notes that it's also on the scalp. Worsened with a copper IUD placement about 3-4 years ago. Noted that the IUD was pure copper. She removed it. Then during the fall and while in large cities, her eczema will become quite severe.    She has exercise-induced asthma. Notes a family Hx of asthma. Was previously prescribed an inhaler which she hasn't had to use.  Reports having rhinitis with runny nose and red eyes when exposed to dogs, cats, and hamsters.    Admits to seasonal allergies to pollen worse in the fall and spring. Also states that she may be allergic to mold due to experiencing symptoms when visiting her parent's house which is quite old.    Had prick test done about 2-3 years ago and it was all negative. She uses sunscreen most of the time in the summer.     Notes she was prescribed dupixent about a month ago but still doesn't have it.    The patient showed me pictures from 2 years ago where there was a clear eczema in the decolletage, well-demarcated, on the neck, face and mostly around the eyes, and a little bit around the upper extremities, but really almost nothing on the lower trunk.     She takes Adderall and xanax for school. Takes also multivitamins, probiotics, zinc, Chased Crow . Occasionally gets steroids injected to the neck and back. Takes  antihistamines when she visits her parents in [place]. Denies having done IgA for gluten.    Pt is a  at the [employer]. 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 No hobbies.      Past Medical History:   There is no problem list on file for this patient.    No past medical history on file.  No past surgical history on file.    Social History:  Pt is a  at the [employer]. 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 No hobbies.    Family History:  No family history on file.    Medications:  No current outpatient medications on file.       Allergies   Allergen Reactions     Acetaminophen Shortness Of Breath     Ibuprofen Shortness Of Breath     Azithromycin Hives     Copper Hives       Review of Systems:  -As per HPI  -Constitutional: Otherwise feeling well today, in usual state of health.  -Skin: As above in HPI. No additional skin concerns.    Physical exam:  Vitals: There were no vitals taken for this visit.  GEN: This is a well developed, well-nourished female in no acute distress, in a pleasant mood.    SKIN: Focused examination of the face and neck were performed.  - Face and neck clear.  -The patient showed pictures from 2 years ago where there was a clear eczema in the decolletage, well-demarcated, on the neck, face and mostly around the eyes, and a little bit around the upper extremities, but really almost nothing on the lower trunk.   -No other lesions of concern on areas examined.     Order for PATCH TESTS    [] Outpatient  [] Inpatient: Garcia..../ Bed ....      Skin Atopy (atopic dermatitis) [x] Yes   [] No  Rhinitis/Sinusitis:   [x] Yes   [] No  Allergic Asthma:   [x] Yes   [] No  Food Allergy:   [] Yes   [x] No no gluten (vomit), soy (throat closes, nauseous), quinoa (bloating)  Leg ulcers:   [] Yes   [x] No  Hand eczema:   [] Yes   [x] No   Leading hand:   [] R   [] L       [] Ambidextrous                      Reason for tests (suspected allergy): Recurrent  eczemas mostly upper trunk and face and scalp  Known previous allergies: maybe metals, cobalt? Nickel?    Standardized panels  [x] Standard panel (40 tests)  [x] Preservatives & Antimicrobials (31 tests)  [x] Emulsifiers & Additives (25 tests)   [x] Perfumes/Flavours & Plants (25 tests)  [] Hairdresser panel (12 tests)  [] Rubber Chemicals (22 tests)  [] Plastics (26 tests)  [] Colorants/Dyes/Food additives (20 tests)  [] Metals (implants/dental) (24 tests)  [] Local anaesthetics/NSAIDs (13 tests)  [] Antibiotics & Antimycotics (14 tests)   [] Corticosteroids (15 tests)   [] Photopatch test (62 tests)   [] others: ...      [] Patient's own products: ...    DO NOT test if chemical or biological identity is unknown!     always ask from patient the product information and safety sheets (MSDS)   [x] Atopy screen prick test (Atopic predisposition): also add soy and wheat. Also intradermal test if everything is negative (immediate and delayed) to molds    [] Patient needs consultation with Allergy team (changes of tests may apply)  [] Tests discussed with Allergy team (can have direct appointment for patch tests)    Atopy Screen (Placed 12/02/19 )    No Substance Readings (15 min) Evaluation   POS Histamine 1mg/ml ++    NEG NaCl 0.9% -      No Substance Readings (15 min) Evaluation   1 Alternaria alternata (tenuis)  -    2 Cladosporium herbarum -    3 Aspergillus fumigatus -    4 Penicillium notatum -    5 Dermatophagoides pteronyssinus +++    6 Dermatophagoides farinae +++/++++    7 Dog epithelium (canis spp) -    8 Cat hair (jordan catus) +    9 Cockroach   (Blatella americana & germanica) -    10 Grass mix midwest   (Claudia, Orchard, Redtop, Aba) -    11 Herminio grass (sorghum halepense) +    12 Weed mix   (common Cocklebur, Lamb s quarters, rough redroot Pigweed, Dock/Sorrel) ++    13 Mug wort (artemisia vulgare) +    14 Ragweed giant/short (ambrosia spp) ++++    15 English Plantain (plantago lanceolata) +    16 Tree  mix 1 (Pecan, Maple BHR, Oak RVW, american Aurora, black London) +/++    17 Red cedar (juniperus virginia) -    18 Tree mix 2   (white Yoel, river/red Birch, black Braymer, common Snow Shoe, american Elm) +    19 Box elder/Maple mix (acer spp) ++    20 Hickory shagbark (carya ovata) +/++    21 Wheat -     22 Soy -      Conclusion: Patient has a massive reaction to house dust mites, much less to animal dander. In addition, she reacts to weed pollen and less to grass and tree pollen. This means patient is atopic.    RESULTS & EVALUATION of PATCH TESTS    Patch test readings after     [x] 2 days, [] 3 days [x] 4 days, [] 5 days,    Applied patch tests with results (import here the list of patch tests):  Order documented by: ANNA JADE LPN  Order reviewed by:  Physician:   Date/time of application:12-2-2019  Localization of application: back >> no lesions    STANDARD Series         No Substance 2 days 4 days remarks   1 Michael Mix [C] - -    2 Colophony - -    3  2-Mercaptobenzothiazole  - -     4 Methylisothiazolinone - -    5 Carba Mix - -    6 Thiuram Mix [A] NA NA    7 Bisphenol A Epoxy Resin - -    8 E-Djde-Qssddgalteb-Formaldehyde Resin - -    9 Mercapto Mix [A] - -    10 Black Rubber Mix- PPD [B] NA NA    11 Potassium Dichromate  -  -    12 Balsam of [place] (Myroxylon Pereirae Resin) - -    13 Nickel Sulphate Hexahydrate - -    14 Mixed Dialkyl Thiourea - -    15 Paraben Mix [B] - -    16 Methyldibromo Glutaronitrile - -    17 Fragrance Mix - -    18 2-Bromo-2-Nitropropane-1,3-Diol (Bronopol) - -    19 Lyral - -    20 Tixocortol-21- Pivalate - -    21 Diazolidiyl Urea (Germall II) - -    22 Methyl Methacrylate NA NA    23 Cobalt (II) Chloride Hexahydrate - -    24 Fragrance Mix II  - -    25 Compositae Mix - -    26 Benzoyl Peroxide - -    27 Bacitracin - -    28 Formaldehyde - -    29 Methylchloroisothiazolinone / Methylisothiazolinone - -    30 Corticosteroid Mix - -    31 Sodium Lauryl Sulfate - -     32 Lanolin Alcohol - -    33 Turpentine - -    34 Cetylstearylalcohol - -    35 Chlorhexidine Dicluconate - -    36 Budenoside - -    37 Imidazolidinyl Urea  - -    38 Ethyl-2 Cyanoacrylate NA NA    39 Quaternium 15 (Dowicil 200) - -    40 Decyl Glucoside - -      EMULSIFIERS & ADDITIVES        No Substance 2 days 4 days remarks   41 Polyethylene Glycol-400 NA NA    42 Cocamidopropyl Betaine - -    43 Amerchol L101 NA NA    44 Propylene Glycol - -    45 Triethanolamine - -    46 Sorbitane Sesquiolate - -    47 Isopropylmyristate - -    48 Polysorbate 80  - -    49 Amidoamine   (Stearamidopropyl Dimethylamine) - -    50 Oleamidopropyl Dimethylamine - -    51 Lauryl Glucoside - -    52 Coconut Diethanolamide  - -    53 2-Hydroxy-4-Methoxy Benzophenone (Oxybenzone) - -    54 Benzophenone-4 (Sulisobenzon) - -    55 Propolis - -    56 Dexpanthenol - -    57 Carboxymethyl Cellulose Sodium - -    58 Abitol - -    59 Tert-Butylhydroquinone - -    60 Benzyl Salicylate - -     Antioxidant      61 Dodecyl Gallate - -    62 Butylhydroxyanisole (BHA) - -    63 Butylhydroxytoluene (BHT) - -    64 Di-Alpha-Tocopherol (Vit E) - -    65 Propyl Gallate - -      PERFUMES, FLAVORS & PLANTS         No Substance 2 days 4 days remarks   66 Benzyl Salicylate - -    67 Benzyl Cinnamate - -    68 Di-Limonene (Dipentene) NA NA    69 Cananga Odorata (Giovanni Davila) (I) - -    70 Lichen Acid Mix - -    71 Mentha Piperita Oil (Peppermint Oil) - -    72 Sesquiterpenelactone mix - -    73 Tea Tree Oil, Oxidized - -    74 Wood Tar Mix - -    75 Abietic Acid NA NA    76 Lavendula Angustifolia Oil (Lavender Oil) - -    77 Camphor  NA NA     Fragrance Mix I      78 Oakmoss Absolute - -    79 Eugenol - -    80 Geraniol - -    81 Hydroxycitronellal - -    82 Isoeugenol - -    83 Cinnamic Aldehyde - -    84 Cinnamic Alcohol  - -    85 Sorbitane Sesquioleate NA NA     Fragrance mix II      86 Citronellol - -    87 Alpha-Hexylcinnamic Aldehyde    - -     88 Citral - -    89 Farnesol - -    90 Coumarin - -      PRESERVATIVES & ANTIMICROBIALS         No Substance 2 days 4 days remarks   91  1,2-Benzisothiazoline-3-One, Sodium Salt - -    92  1,3,5-Alec (2-Hydroxyethyl) - Hexahydrotriazine (Grotan BK) - -    93 9-Cfkphvyiwxjzy-6-Nitro-1, 3-Propanediol - -    94  3, 4, 4' - Triclocarban - -    95 4 - Chloro - 3 - Cresol - -    96 4 - Chloro - 4 - Xylenol (PCMX) - -    97 7-Ethylbicyclooxazolidine (Bioban BQ5730) - -    98 Benzalkonium Chloride - -    99 Benzyl Alcohol - -    100 Cetalkonium Chloride - -    101 Cetylpyrimidine Chloride  - -    102 Chloroacetamide - -    103 DMDM Hydantoin - -    104 Glutaraldehyde NA NA    105 Triclosan - -    106 Glyoxal Trimeric Dihydrate - -    107 Iodopropynyl Butylcarbamate - -    109 Octylisothiazoline - -    109 Iodoform - -    110 (Nitrobutyl) Morpholine/(Ethylnitro-Trimethylene) Dimorpholine (Bioban P 1487) - -    111 Phenoxyethanol - -    112 Phenyl Salicylate - -    113 Povidone Iodine - -    114 Sodium Benzoate - -    115 Sodium Disulfite NA NA    116 Sorbic Acid - -    117 Thimerosal - -     Parabens      118 Butyl-P-Hydroxybenzoate - -    119 Ethyl-P-Hydroxybenzoate - -    120 Methyl-P-Hydroxybenzoate NA NA    121 Propyl-P-Hydroxybenzoate - -       [] No relevant allergic reaction observed    [] Allergic reaction diagnosed against: see later      Interpretation/ remarks:   See later    [] Patient information given   [] ACDS CAMP information's (# ....) to following compounds: .....   [] General information's to following compounds: ......    ==> final Diagnosis:  See later    >> Atopic predisposition with     atopic dermatitis since childhood    Allergic rhinoconjunctivitis with relevant sensitization to dust mite and weed pollen, less grass and tree pollen    exercise-induced aggravation with asthma with sensitization to animal dander, pollens, dust mites, molds?    >> Flare-ups of eczemas on face, scalp, wisam-orbital  neck and decolletage and arms  DDx airborne contact dermatitis, photodermatitis? (more in winter), or atopic dermatitis with delayed type reactions to dust mites, molds or pollens    ==> Treatment prescribed/Plan:    If patch test on Wednesday negative, we will maybe do intradermal testing for molds    On Wednesday, will do delayed reading of prick test of dust mites (performed on 12/2/19)    These conclusions are made at the best of one's knowledge and belief based on the provided evidence such as patient's history and allergy test results and they can change over time or can be incomplete because of missing information's.      CC Lisseth Dunlap MD  [facility]  [address]  [place] [address] on close of this encounter.    Follow-up for ongoing patch testing.      Staff Involved:    Scribe Disclosure  I, Kathy Espinosa, am serving as a scribe to document services personally performed by Dr. Kranthi Johnson, based on data collection and the provider's statements to me.     I spent a total of 22 minutes face to face with Sayra Atkinson during today s office visit. Over 50% of this time was spent counseling the patient and/or coordinating care.  Please see Assessment and Plan for details.  This excludes any time spent performing prick and patch tests

## 2019-12-02 NOTE — LETTER
12/2/2019         RE: Sayra Atkinson  [address] [address]        Dear Colleague,    Thank you for referring your patient, Sayra Atkinson, to the [facility] ALLERGY. Please see a copy of my visit note below.    [facility] Allergy Note    Allergy Clinic  [facility]  [facility]  [address] [place] [id number]    Encounter Date: Dec 2, 2019    CC:  Chief Complaint   Patient presents with     Allergy Testing Followup     Sayra is here for allergy testing day 1       History of Present Illness:  Ms. Sayra Atkinson is a 24 year old female who presents today for consultation for rash. The patient was referred by Lisseth Dunlap of [facility]. Pt reports that she has had eczema since childhood at about 4 years old. Mostly it appears as a rash on the face and upper extremities. Notes that it's also on the scalp. Worsened with a copper IUD placement about 3-4 years ago. Noted that the IUD was pure copper. She removed it. Then during the fall and while in large cities, her eczema will become quite severe.    She has exercise-induced asthma. Notes a family Hx of asthma. Was previously prescribed an inhaler which she hasn't had to use.  Reports having rhinitis with runny nose and red eyes when exposed to dogs, cats, and hamsters.    Admits to seasonal allergies to pollen worse in the fall and spring. Also states that she may be allergic to mold due to experiencing symptoms when visiting her parent's house which is quite old.    Had prick test done about 2-3 years ago and it was all negative. She uses sunscreen most of the time in the summer.     Notes she was prescribed dupixent about a month ago but still doesn't have it.    The patient showed me pictures from 2 years ago where there was a clear eczema in the decolletage, well-demarcated, on the neck, face and mostly around the eyes, and a little bit around the upper  extremities, but really almost nothing on the lower trunk.     She takes Adderall and xanax for school. Takes also multivitamins, probiotics, zinc, Chased Crow . Occasionally gets steroids injected to the neck and back. Takes antihistamines when she visits her parents in [place]. Denies having done IgA for gluten.    Pt is a  at the [employer]. 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 No hobbies.      Past Medical History:   There is no problem list on file for this patient.    No past medical history on file.  No past surgical history on file.    Social History:  Pt is a  at the [employer]. 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 No hobbies.    Family History:  No family history on file.    Medications:  No current outpatient medications on file.       Allergies   Allergen Reactions     Acetaminophen Shortness Of Breath     Ibuprofen Shortness Of Breath     Azithromycin Hives     Copper Hives       Review of Systems:  -As per HPI  -Constitutional: Otherwise feeling well today, in usual state of health.  -Skin: As above in HPI. No additional skin concerns.    Physical exam:  Vitals: There were no vitals taken for this visit.  GEN: This is a well developed, well-nourished female in no acute distress, in a pleasant mood.    SKIN: Focused examination of the face and neck were performed.  - Face and neck clear.  -The patient showed pictures from 2 years ago where there was a clear eczema in the decolletage, well-demarcated, on the neck, face and mostly around the eyes, and a little bit around the upper extremities, but really almost nothing on the lower trunk.   -No other lesions of concern on areas examined.     Order for PATCH TESTS    [] Outpatient  [] Inpatient: [facility]..../ Bed ....      Skin Atopy (atopic dermatitis) [x] Yes   [] No  Rhinitis/Sinusitis:   [x] Yes   [] No  Allergic Asthma:   [x] Yes   [] No  Food Allergy:   [] Yes   [x] No no gluten (vomit), soy  (throat closes, nauseous), quinoa (bloating)  Leg ulcers:   [] Yes   [x] No  Hand eczema:   [] Yes   [x] No   Leading hand:   [] R   [] L       [] Ambidextrous                      Reason for tests (suspected allergy): Recurrent eczemas mostly upper trunk and face and scalp  Known previous allergies: maybe metals, cobalt? Nickel?    Standardized panels  [x] Standard panel (40 tests)  [x] Preservatives & Antimicrobials (31 tests)  [x] Emulsifiers & Additives (25 tests)   [x] Perfumes/Flavours & Plants (25 tests)  [] Hairdresser panel (12 tests)  [] Rubber Chemicals (22 tests)  [] Plastics (26 tests)  [] Colorants/Dyes/Food additives (20 tests)  [] Metals (implants/dental) (24 tests)  [] Local anaesthetics/NSAIDs (13 tests)  [] Antibiotics & Antimycotics (14 tests)   [] Corticosteroids (15 tests)   [] Photopatch test (62 tests)   [] others: ...      [] Patient's own products: ...    DO NOT test if chemical or biological identity is unknown!     always ask from patient the product information and safety sheets (MSDS)   [x] Atopy screen prick test (Atopic predisposition): also add soy and wheat. Also intradermal test if everything is negative (immediate and delayed) to molds    [] Patient needs consultation with Allergy team (changes of tests may apply)  [] Tests discussed with Allergy team (can have direct appointment for patch tests)    Atopy Screen (Placed 12/02/19 )    No Substance Readings (15 min) Evaluation   POS Histamine 1mg/ml ++    NEG NaCl 0.9% -      No Substance Readings (15 min) Evaluation   1 Alternaria alternata (tenuis)  -    2 Cladosporium herbarum -    3 Aspergillus fumigatus -    4 Penicillium notatum -    5 Dermatophagoides pteronyssinus +++    6 Dermatophagoides farinae +++/++++    7 Dog epithelium (canis spp) -    8 Cat hair (jordan catus) +    9 Cockroach   (Blatella americana & germanica) -    10 Grass mix midwest   (Claudia, Orchard, Redtop, Aba) -    11 Herminio grass (sorghum halepense) +     12 Weed mix   (common Cocklebur, Lamb s quarters, rough redroot Pigweed, Dock/Sorrel) ++    13 Mug wort (artemisia vulgare) +    14 Ragweed giant/short (ambrosia spp) ++++    15 English Plantain (plantago lanceolata) +    16 Tree mix 1 (Pecan, Maple BHR, Oak RVW, american [place], black [place]) +/++    17 Red cedar (juniperus virginia) -    18 Tree mix 2   (white Yoel, river/red Birch, black [place], common [place], american Elm) +    19 Box elder/Maple mix (acer spp) ++    20 Hickory shagbark (carya ovata) +/++    21 Wheat -     22 Soy -      Conclusion: Patient has a massive reaction to house dust mites, much less to animal dander. In addition, she reacts to weed pollen and less to grass and tree pollen. This means patient is atopic.    RESULTS & EVALUATION of PATCH TESTS    Patch test readings after     [x] 2 days, [] 3 days [x] 4 days, [] 5 days,    Applied patch tests with results (import here the list of patch tests):  Order documented by: ANNA JADE LPN  Order reviewed by:  Physician:   Date/time of application:12-2-2019  Localization of application: back >> no lesions    STANDARD Series         No Substance 2 days 4 days remarks   1 Michael Mix [C] - -    2 Colophony - -    3  2-Mercaptobenzothiazole  - -     4 Methylisothiazolinone - -    5 Carba Mix - -    6 Thiuram Mix [A] NA NA    7 Bisphenol A Epoxy Resin - -    8 Z-Zzyv-Eacpgxrnvfv-Formaldehyde Resin - -    9 Mercapto Mix [A] - -    10 Black Rubber Mix- PPD [B] NA NA    11 Potassium Dichromate  -  -    12 Balsam of [place] (Myroxylon Pereirae Resin) - -    13 Nickel Sulphate Hexahydrate - -    14 Mixed Dialkyl Thiourea - -    15 Paraben Mix [B] - -    16 Methyldibromo Glutaronitrile - -    17 Fragrance Mix - -    18 2-Bromo-2-Nitropropane-1,3-Diol (Bronopol) - -    19 Lyral - -    20 Tixocortol-21- Pivalate - -    21 Diazolidiyl Urea (Germall II) - -    22 Methyl Methacrylate NA NA    23 Cobalt (II) Chloride Hexahydrate - -    24  Fragrance Mix II  - -    25 Compositae Mix - -    26 Benzoyl Peroxide - -    27 Bacitracin - -    28 Formaldehyde - -    29 Methylchloroisothiazolinone / Methylisothiazolinone - -    30 Corticosteroid Mix - -    31 Sodium Lauryl Sulfate - -    32 Lanolin Alcohol - -    33 Turpentine - -    34 Cetylstearylalcohol - -    35 Chlorhexidine Dicluconate - -    36 Budenoside - -    37 Imidazolidinyl Urea  - -    38 Ethyl-2 Cyanoacrylate NA NA    39 Quaternium 15 (Dowicil 200) - -    40 Decyl Glucoside - -      EMULSIFIERS & ADDITIVES        No Substance 2 days 4 days remarks   41 Polyethylene Glycol-400 NA NA    42 Cocamidopropyl Betaine - -    43 Amerchol L101 NA NA    44 Propylene Glycol - -    45 Triethanolamine - -    46 Sorbitane Sesquiolate - -    47 Isopropylmyristate - -    48 Polysorbate 80  - -    49 Amidoamine   (Stearamidopropyl Dimethylamine) - -    50 Oleamidopropyl Dimethylamine - -    51 Lauryl Glucoside - -    52 Coconut Diethanolamide  - -    53 2-Hydroxy-4-Methoxy Benzophenone (Oxybenzone) - -    54 Benzophenone-4 (Sulisobenzon) - -    55 Propolis - -    56 Dexpanthenol - -    57 Carboxymethyl Cellulose Sodium - -    58 Abitol - -    59 Tert-Butylhydroquinone - -    60 Benzyl Salicylate - -     Antioxidant      61 Dodecyl Gallate - -    62 Butylhydroxyanisole (BHA) - -    63 Butylhydroxytoluene (BHT) - -    64 Di-Alpha-Tocopherol (Vit E) - -    65 Propyl Gallate - -      PERFUMES, FLAVORS & PLANTS         No Substance 2 days 4 days remarks   66 Benzyl Salicylate - -    67 Benzyl Cinnamate - -    68 Di-Limonene (Dipentene) NA NA    69 Cananga Odorata (Giovanni Davila) (I) - -    70 Lichen Acid Mix - -    71 Mentha Piperita Oil (Peppermint Oil) - -    72 Sesquiterpenelactone mix - -    73 Tea Tree Oil, Oxidized - -    74 Wood Tar Mix - -    75 Abietic Acid NA NA    76 Lavendula Angustifolia Oil (Lavender Oil) - -    77 Camphor  NA NA     Fragrance Mix I      78 Oakmoss Absolute - -    79 Eugenol - -    80  Geraniol - -    81 Hydroxycitronellal - -    82 Isoeugenol - -    83 Cinnamic Aldehyde - -    84 Cinnamic Alcohol  - -    85 Sorbitane Sesquioleate NA NA     Fragrance mix II      86 Citronellol - -    87 Alpha-Hexylcinnamic Aldehyde    - -    88 Citral - -    89 Farnesol - -    90 Coumarin - -      PRESERVATIVES & ANTIMICROBIALS         No Substance 2 days 4 days remarks   91  1,2-Benzisothiazoline-3-One, Sodium Salt - -    92  1,3,5-Aelc (2-Hydroxyethyl) - Hexahydrotriazine (Grotan BK) - -    93 1-Mpokjcknmwfbz-9-Nitro-1, 3-Propanediol - -    94  3, 4, 4' - Triclocarban - -    95 4 - Chloro - 3 - Cresol - -    96 4 - Chloro - 4 - Xylenol (PCMX) - -    97 7-Ethylbicyclooxazolidine (Send Word Nowan [id number]) - -    98 Benzalkonium Chloride - -    99 Benzyl Alcohol - -    100 Cetalkonium Chloride - -    101 Cetylpyrimidine Chloride  - -    102 Chloroacetamide - -    103 DMDM Hydantoin - -    104 Glutaraldehyde NA NA    105 Triclosan - -    106 Glyoxal Trimeric Dihydrate - -    107 Iodopropynyl Butylcarbamate - -    109 Octylisothiazoline - -    109 Iodoform - -    110 (Nitrobutyl) Morpholine/(Ethylnitro-Trimethylene) Dimorpholine (Bioban P 1487) - -    111 Phenoxyethanol - -    112 Phenyl Salicylate - -    113 Povidone Iodine - -    114 Sodium Benzoate - -    115 Sodium Disulfite NA NA    116 Sorbic Acid - -    117 Thimerosal - -     Parabens      118 Butyl-P-Hydroxybenzoate - -    119 Ethyl-P-Hydroxybenzoate - -    120 Methyl-P-Hydroxybenzoate NA NA    121 Propyl-P-Hydroxybenzoate - -       [] No relevant allergic reaction observed    [] Allergic reaction diagnosed against: see later      Interpretation/ remarks:   See later    [] Patient information given   [] ACDS CAMP information's (# ....) to following compounds: .....   [] General information's to following compounds: ......    ==> final Diagnosis:  See later    >> Atopic predisposition with     atopic dermatitis since childhood    Allergic rhinoconjunctivitis with  relevant sensitization to dust mite and weed pollen, less grass and tree pollen    exercise-induced aggravation with asthma with sensitization to animal dander, pollens, dust mites, molds?    >> Flare-ups of eczemas on face, scalp, wisam-orbital neck and decolletage and arms  DDx airborne contact dermatitis, photodermatitis? (more in winter), or atopic dermatitis with delayed type reactions to dust mites, molds or pollens    ==> Treatment prescribed/Plan:    If patch test on Wednesday negative, we will maybe do intradermal testing for molds    On Wednesday, will do delayed reading of prick test of dust mites (performed on 12/2/19)    These conclusions are made at the best of one's knowledge and belief based on the provided evidence such as patient's history and allergy test results and they can change over time or can be incomplete because of missing information's.      CC Lisseth Dunlap MD  [facility]  [address]  [place] [address] on close of this encounter.    Follow-up for ongoing patch testing.      Staff Involved:    Scribe Disclosure  I, Kathy Espinosa, am serving as a scribe to document services personally performed by Dr. Kranthi Johnson, based on data collection and the provider's statements to me.     I spent a total of 22 minutes face to face with Sayra Atkinson during today s office visit. Over 50% of this time was spent counseling the patient and/or coordinating care.  Please see Assessment and Plan for details.  This excludes any time spent performing prick and patch tests                    Again, thank you for allowing me to participate in the care of your patient.        Sincerely,        Kranthi Johnson MD

## 2019-12-02 NOTE — TELEPHONE ENCOUNTER
Order documented by: ANNA JADE LPN  Order reviewed by:  Physician:   Date/time of application:12-2-2019  Localization of application: back     STANDARD Series         No Substance 2 days 4 days remarks   1 Michael Mix [C] - -    2 Colophony - -    3  2-Mercaptobenzothiazole  - -     4 Methylisothiazolinone - -    5 Carba Mix - -    6 Thiuram Mix [A] - -    7 Bisphenol A Epoxy Resin - -    8 Z-Lsyc-Jwzyhectcuf-Formaldehyde Resin - -    9 Mercapto Mix [A] - -    10 Black Rubber Mix- PPD [B] - -    11 Potassium Dichromate  -  -    12 Balsam of [place] (Myroxylon Pereirae Resin) - -    13 Nickel Sulphate Hexahydrate - -    14 Mixed Dialkyl Thiourea - -    15 Paraben Mix [B] - -    16 Methyldibromo Glutaronitrile - -    17 Fragrance Mix - -    18 2-Bromo-2-Nitropropane-1,3-Diol (Bronopol) - -    19 Lyral - -    20 Tixocortol-21- Pivalate - -    21 Diazolidiyl Urea (Germall II) - -    22 Methyl Methacrylate - -    23 Cobalt (II) Chloride Hexahydrate - -    24 Fragrance Mix II  - -    25 Compositae Mix - -    26 Benzoyl Peroxide - -    27 Bacitracin - -    28 Formaldehyde - -    29 Methylchloroisothiazolinone / Methylisothiazolinone - -    30 Corticosteroid Mix - -    31 Sodium Lauryl Sulfate - -    32 Lanolin Alcohol - -    33 Turpentine - -    34 Cetylstearylalcohol - -    35 Chlorhexidine Dicluconate - -    36 Budenoside - -    37 Imidazolidinyl Urea  - -    38 Ethyl-2 Cyanoacrylate - -    39 Quaternium 15 (Dowicil 200) - -    40 Decyl Glucoside - -      EMULSIFIERS & ADDITIVES        No Substance 2 days 4 days remarks   41 Polyethylene Glycol-400 - -    42 Cocamidopropyl Betaine - -    43 Amerchol L101 - -    44 Propylene Glycol - -    45 Triethanolamine - -    46 Sorbitane Sesquiolate - -    47 Isopropylmyristate - -    48 Polysorbate 80  - -    49 Amidoamine   (Stearamidopropyl Dimethylamine) - -    50 Oleamidopropyl Dimethylamine - -    51 Lauryl Glucoside - -    52 Coconut Diethanolamide  - -    53  2-Hydroxy-4-Methoxy Benzophenone (Oxybenzone) - -    54 Benzophenone-4 (Sulisobenzon) - -    55 Propolis - -    56 Dexpanthenol - -    57 Carboxymethyl Cellulose Sodium - -    58 Abitol - -    59 Tert-Butylhydroquinone - -    60 Benzyl Salicylate - -     Antioxidant      61 Dodecyl Gallate - -    62 Butylhydroxyanisole (BHA) - -    63 Butylhydroxytoluene (BHT) - -    64 Di-Alpha-Tocopherol (Vit E) - -    65 Propyl Gallate - -      PERFUMES, FLAVORS & PLANTS         No Substance 2 days 4 days remarks   66 Benzyl Salicylate - -    67 Benzyl Cinnamate - -    68 Di-Limonene (Dipentene) - -    69 Cananga Odorata (Giovanni Davila) (I) - -    70 Lichen Acid Mix - -    71 Mentha Piperita Oil (Peppermint Oil) - -    72 Sesquiterpenelactone mix - -    73 Tea Tree Oil, Oxidized - -    74 Wood Tar Mix - -    75 Abietic Acid - -    76 Lavendula Angustifolia Oil (Lavender Oil) - -    77 Camphor  - -     Fragrance Mix I      78 Oakmoss Absolute - -    79 Eugenol - -    80 Geraniol - -    81 Hydroxycitronellal - -    82 Isoeugenol - -    83 Cinnamic Aldehyde - -    84 Cinnamic Alcohol  - -    85 Sorbitane Sesquioleate - -     Fragrance mix II      86 Citronellol - -    87 Alpha-Hexylcinnamic Aldehyde    - -    88 Citral - -    89 Farnesol - -    90 Coumarin - -      PRESERVATIVES & ANTIMICROBIALS         No Substance 2 days 4 days remarks   91  1,2-Benzisothiazoline-3-One, Sodium Salt - -    92  1,3,5-Alec (2-Hydroxyethyl) - Hexahydrotriazine (Grotan BK) - -    93 3-Ywjjncgtnhpdc-7-Nitro-1, 3-Propanediol - -    94  3, 4, 4' - Triclocarban - -    95 4 - Chloro - 3 - Cresol - -    96 4 - Chloro - 4 - Xylenol (PCMX) - -    97 7-Ethylbicyclooxazolidine (Bioban [id number]) - -    98 Benzalkonium Chloride - -    99 Benzyl Alcohol - -    100 Cetalkonium Chloride - -    101 Cetylpyrimidine Chloride  - -    102 Chloroacetamide - -    103 DMDM Hydantoin - -    104 Glutaraldehyde - -    105 Triclosan - -    106 Glyoxal Trimeric Dihydrate - -    107  Iodopropynyl Butylcarbamate - -    109 Octylisothiazoline - -    109 Iodoform - -    110 (Nitrobutyl) Morpholine/(Ethylnitro-Trimethylene) Dimorpholine (Bioban P 1487) - -    111 Phenoxyethanol - -    112 Phenyl Salicylate - -    113 Povidone Iodine - -    114 Sodium Benzoate - -    115 Sodium Disulfite - -    116 Sorbic Acid - -    117 Thimerosal - -     Parabens      118 Butyl-P-Hydroxybenzoate - -    119 Ethyl-P-Hydroxybenzoate - -    120 Methyl-P-Hydroxybenzoate - -    121 Propyl-P-Hydroxybenzoate - -

## 2019-12-02 NOTE — NURSING NOTE
Allergy Rooming Note    Sayra Atkinson's goals for this visit include:   Chief Complaint   Patient presents with     Allergy Testing Followup     Sayra is here for allergy testing day 1     Diana Hansen LPN

## 2019-12-04 ENCOUNTER — OFFICE VISIT (OUTPATIENT)
Dept: ALLERGY | Facility: CLINIC | Age: 24
End: 2019-12-04
Payer: COMMERCIAL

## 2019-12-04 DIAGNOSIS — L23.89 ALLERGIC CONTACT DERMATITIS DUE TO OTHER AGENTS: Primary | ICD-10-CM

## 2019-12-04 DIAGNOSIS — Z88.9 ATOPY: ICD-10-CM

## 2019-12-04 ASSESSMENT — PAIN SCALES - GENERAL: PAINLEVEL: NO PAIN (0)

## 2019-12-04 NOTE — LETTER
12/4/2019         RE: Sayra Atkinson  [address] [facility]  [address]  [facility] [address]        Dear Colleague,    Thank you for referring your patient, Sayra Atkinson, to the [facility] ALLERGY. Please see a copy of my visit note below.    [facility] Allergy Note    Allergy Clinic  [facility]  [address] [place] [id number]    Encounter Date: Dec 4, 2019    CC:  No chief complaint on file.      History of Present Illness:  Ms. Sayra Atkinson is a 24 year old female who presents today for consultation for rash. The patient was referred by Lisseth Dunlap of [facility]. Pt reports that she has had eczema since childhood at about 4 years old. Mostly it appears as a rash on the face and upper extremities. Notes that it's also on the scalp. Worsened with a copper IUD placement about 3-4 years ago. Noted that the IUD was pure copper. She removed it. Then during the fall and while in large cities, her eczema will become quite severe.    She has exercise-induced asthma. Notes a family Hx of asthma. Was previously prescribed an inhaler which she hasn't had to use.  Reports having rhinitis with runny nose and red eyes when exposed to dogs, cats, and hamsters.    Admits to seasonal allergies to pollen worse in the fall and spring. Also states that she may be allergic to mold due to experiencing symptoms when visiting her parent's house which is quite old.    Had prick test done about 2-3 years ago and it was all negative. She uses sunscreen most of the time in the summer.     Notes she was prescribed dupixent about a month ago but still doesn't have it.    The patient showed me pictures from 2 years ago where there was a clear eczema in the decolletage, well-demarcated, on the neck, face and mostly around the eyes, and a little bit around the upper extremities, but really almost nothing on the lower trunk.     She takes Adderall and  xanax for school. Takes also multivitamins, probiotics, zinc, Chased Crow . Occasionally gets steroids injected to the neck and back. Takes antihistamines when she visits her parents in [place]. Denies having done IgA for gluten.    Pt is a  at the [employer]. 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 No hobbies.      Past Medical History:   There is no problem list on file for this patient.    No past medical history on file.  No past surgical history on file.    Social History:  Pt is a  at the [employer]. 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 No hobbies.    Family History:  No family history on file.    Medications:  No current outpatient medications on file.       Allergies   Allergen Reactions     Acetaminophen Shortness Of Breath     Ibuprofen Shortness Of Breath     Azithromycin Hives     Copper Hives       Review of Systems:  -As per HPI  -Constitutional: Otherwise feeling well today, in usual state of health.  -Skin: As above in HPI. No additional skin concerns.    Physical exam:  Vitals: There were no vitals taken for this visit.  GEN: This is a well developed, well-nourished female in no acute distress, in a pleasant mood.    SKIN: Focused examination of the face and neck were performed.  - Face and neck clear.  -The patient showed pictures from 2 years ago where there was a clear eczema in the decolletage, well-demarcated, on the neck, face and mostly around the eyes, and a little bit around the upper extremities, but really almost nothing on the lower trunk.   -No other lesions of concern on areas examined.     Order for PATCH TESTS    [] Outpatient  [] Inpatient: Garcia..../ Bed ....      Skin Atopy (atopic dermatitis) [x] Yes   [] No  Rhinitis/Sinusitis:   [x] Yes   [] No  Allergic Asthma:   [x] Yes   [] No  Food Allergy:   [] Yes   [x] No no gluten (vomit), soy (throat closes, nauseous), quinoa (bloating)  Leg ulcers:   [] Yes   [x] No  Hand  eczema:   [] Yes   [x] No   Leading hand:   [] R   [] L       [] Ambidextrous                      Reason for tests (suspected allergy): Recurrent eczemas mostly upper trunk and face and scalp  Known previous allergies: maybe metals, cobalt? Nickel?    Standardized panels  [x] Standard panel (40 tests)  [x] Preservatives & Antimicrobials (31 tests)  [x] Emulsifiers & Additives (25 tests)   [x] Perfumes/Flavours & Plants (25 tests)  [] Hairdresser panel (12 tests)  [] Rubber Chemicals (22 tests)  [] Plastics (26 tests)  [] Colorants/Dyes/Food additives (20 tests)  [] Metals (implants/dental) (24 tests)  [] Local anaesthetics/NSAIDs (13 tests)  [] Antibiotics & Antimycotics (14 tests)   [] Corticosteroids (15 tests)   [] Photopatch test (62 tests)   [] others: ...      [] Patient's own products: ...    DO NOT test if chemical or biological identity is unknown!     always ask from patient the product information and safety sheets (MSDS)   [x] Atopy screen prick test (Atopic predisposition): also add soy and wheat. Also intradermal test if everything is negative (immediate and delayed) to molds    [] Patient needs consultation with Allergy team (changes of tests may apply)  [] Tests discussed with Allergy team (can have direct appointment for patch tests)    Atopy Screen (Placed 12/02/19 )    No Substance Readings (15 min) Evaluation   POS Histamine 1mg/ml ++    NEG NaCl 0.9% -      No Substance Readings (15 min) Evaluation   1 Alternaria alternata (tenuis)  -    2 Cladosporium herbarum -    3 Aspergillus fumigatus -    4 Penicillium notatum -    5 Dermatophagoides pteronyssinus +++    6 Dermatophagoides farinae +++/++++    7 Dog epithelium (canis spp) -    8 Cat hair (jordan catus) +    9 Cockroach   (Blatella americana & germanica) -    10 Grass mix midwest   (Claudia, Orchard, Redtop, Aba) -    11 Herminio grass (sorghum halepense) +    12 Weed mix   (common Cocklebur, Lamb s quarters, rough redroot Pigweed,  Dock/Sorrel) ++    13 Mug wort (artemisia vulgare) +    14 Ragweed giant/short (ambrosia spp) ++++    15 English Plantain (plantago lanceolata) +    16 Tree mix 1 (Pecan, Maple BHR, Oak RVW, american Irving, black Jackson) +/++    17 Red cedar (juniperus virginia) -    18 Tree mix 2   (white Yoel, river/red Birch, black North [place], common [place], american Elm) +    19 Box elder/Maple mix (acer spp) ++    20 Hickory shagbark (carya ovata) +/++    21 Wheat -     22 Soy -      Conclusion: Patient has a massive reaction to house dust mites, much less to animal dander. In addition, she reacts to weed pollen and less to grass and tree pollen. This means patient is atopic. Slight delayed reaction on 12/04/19 to D. Farinae.    RESULTS & EVALUATION of PATCH TESTS    Patch test readings after     [x] 2 days, [] 3 days [x] 4 days, [] 5 days,    Applied patch tests with results (import here the list of patch tests):  Order documented by: ANNA JADE LPN  Order reviewed by:  Physician:   Date/time of application:12-2-2019  Localization of application: back >> no lesions    STANDARD Series         No Substance 2 days 4 days remarks   1 Michael Mix [C] - -    2 Colophony - -    3  2-Mercaptobenzothiazole  - -     4 Methylisothiazolinone - -    5 Carba Mix - -    6 Thiuram Mix [A] NA NA    7 Bisphenol A Epoxy Resin - -    8 C-Zrhh-Diwqibdxmjb-Formaldehyde Resin - -    9 Mercapto Mix [A] - -    10 Black Rubber Mix- PPD [B] NA NA    11 Potassium Dichromate  -  -    12 Balsam of [place] (Myroxylon Pereirae Resin) - -    13 Nickel Sulphate Hexahydrate - -    14 Mixed Dialkyl Thiourea - -    15 Paraben Mix [B] - -    16 Methyldibromo Glutaronitrile - -    17 Fragrance Mix ++ -    18 2-Bromo-2-Nitropropane-1,3-Diol (Bronopol) - -    19 Lyral - -    20 Tixocortol-21- Pivalate - -    21 Diazolidiyl Urea (Germall II) - -    22 Methyl Methacrylate NA NA    23 Cobalt (II) Chloride Hexahydrate - -    24 Fragrance Mix II  - -    25  Compositae Mix - -    26 Benzoyl Peroxide (+) -    27 Bacitracin - -    28 Formaldehyde - -    29 Methylchloroisothiazolinone / Methylisothiazolinone - -    30 Corticosteroid Mix - -    31 Sodium Lauryl Sulfate - -    32 Lanolin Alcohol - -    33 Turpentine - -    34 Cetylstearylalcohol - -    35 Chlorhexidine Dicluconate - -    36 Budenoside - -    37 Imidazolidinyl Urea  - -    38 Ethyl-2 Cyanoacrylate NA NA    39 Quaternium 15 (Dowicil 200) - -    40 Decyl Glucoside - -      EMULSIFIERS & ADDITIVES        No Substance 2 days 4 days remarks   41 Polyethylene Glycol-400 NA NA    42 Cocamidopropyl Betaine - -    43 Amerchol L101 NA NA    44 Propylene Glycol - -    45 Triethanolamine - -    46 Sorbitane Sesquiolate - -    47 Isopropylmyristate - -    48 Polysorbate 80  - -    49 Amidoamine   (Stearamidopropyl Dimethylamine) - -    50 Oleamidopropyl Dimethylamine - -    51 Lauryl Glucoside - -    52 Coconut Diethanolamide  - -    53 2-Hydroxy-4-Methoxy Benzophenone (Oxybenzone) - -    54 Benzophenone-4 (Sulisobenzon) - -    55 Propolis - -    56 Dexpanthenol - -    57 Carboxymethyl Cellulose Sodium - -    58 Abitol - -    59 Tert-Butylhydroquinone - -    60 Benzyl Salicylate - -     Antioxidant      61 Dodecyl Gallate - -    62 Butylhydroxyanisole (BHA) - -    63 Butylhydroxytoluene (BHT) - -    64 Di-Alpha-Tocopherol (Vit E) - -    65 Propyl Gallate - -      PERFUMES, FLAVORS & PLANTS         No Substance 2 days 4 days remarks   66 Benzyl Salicylate - -    67 Benzyl Cinnamate - -    68 Di-Limonene (Dipentene) NA NA    69 Cananga Odorata (Giovanni Davila) (I) + -    70 Lichen Acid Mix - -    71 Mentha Piperita Oil (Peppermint Oil) - -    72 Sesquiterpenelactone mix - -    73 Tea Tree Oil, Oxidized - -    74 Wood Tar Mix (+) -    75 Abietic Acid NA NA    76 Lavendula Angustifolia Oil (Lavender Oil) - -    77 Camphor  NA NA     Fragrance Mix I      78 Oakmoss Absolute - -    79 Eugenol - -    80 Geraniol - -    81  Hydroxycitronellal - -    82 Isoeugenol ++ -    83 Cinnamic Aldehyde - -    84 Cinnamic Alcohol  - -    85 Sorbitane Sesquioleate NA NA     Fragrance mix II      86 Citronellol - -    87 Alpha-Hexylcinnamic Aldehyde    - -    88 Citral - -    89 Farnesol - -    90 Coumarin - -      PRESERVATIVES & ANTIMICROBIALS         No Substance 2 days 4 days remarks   91  1,2-Benzisothiazoline-3-One, Sodium Salt - -    92  1,3,5-Alec (2-Hydroxyethyl) - Hexahydrotriazine (Grotan BK) - -    93 9-Knipkhpmthqmz-8-Nitro-1, 3-Propanediol - -    94  3, 4, 4' - Triclocarban - -    95 4 - Chloro - 3 - Cresol - -    96 4 - Chloro - 4 - Xylenol (PCMX) - -    97 7-Ethylbicyclooxazolidine (Bioban [id number]) - -    98 Benzalkonium Chloride - -    99 Benzyl Alcohol - -    100 Cetalkonium Chloride - -    101 Cetylpyrimidine Chloride  - -    102 Chloroacetamide - -    103 DMDM Hydantoin - -    104 Glutaraldehyde NA NA    105 Triclosan - -    106 Glyoxal Trimeric Dihydrate - -    107 Iodopropynyl Butylcarbamate - -    109 Octylisothiazoline - -    109 Iodoform - -    110 (Nitrobutyl) Morpholine/(Ethylnitro-Trimethylene) Dimorpholine (Bioban P 1487) - -    111 Phenoxyethanol - -    112 Phenyl Salicylate - -    113 Povidone Iodine - -    114 Sodium Benzoate - -    115 Sodium Disulfite NA NA    116 Sorbic Acid - -    117 Thimerosal - -     Parabens      118 Butyl-P-Hydroxybenzoate - -    119 Ethyl-P-Hydroxybenzoate - -    120 Methyl-P-Hydroxybenzoate NA NA    121 Propyl-P-Hydroxybenzoate - -       [] No relevant allergic reaction observed    [x] Allergic reaction diagnosed against: fragrance mix I, in particular, isoeugenol; ylang ylang; wood tar mix      Interpretation/ remarks:   See later    [] Patient information given   [] ACDS CAMP information's (# ....) to following compounds: .....   [] General information's to following compounds: ......    ==> final Diagnosis:      >> Atopic predisposition with     atopic dermatitis since  childhood    Allergic rhinoconjunctivitis with relevant sensitization to dust mite and weed pollen, less grass and tree pollen    exercise-induced aggravation with asthma with sensitization to animal dander, pollens, dust mites, molds?    >> Flare-ups of eczemas on face, scalp, wisam-orbital neck and decolletage and arms with:    Relevant delayed allergic contact sensitization to fragrances, incl Isoeugenol, YlangYlang = airborne contact dermatitis    In prick tests, slight delayed reaction to D. farinae after 2 days = atopic dermatitis    ==> Treatment prescribed/Plan:    See later    These conclusions are made at the best of one's knowledge and belief based on the provided evidence such as patient's history and allergy test results and they can change over time or can be incomplete because of missing information's.      CC Lisseth Dunlap MD  [facility]  [address] [facility] [address]  [place] [address] on close of this encounter.    Follow-up for ongoing patch testing.    Scribe Disclosure  I, Kathy Odom, am serving as a scribe to document services personally performed by Dr. Kranthi Johnson MD, based on data collection and the provider's statements to me.    I spent a total of 15 minutes face to face with Sayra Atkinson during today s office visit. Over 50% of this time was spent counseling the patient and/or coordinating care. Please see Assessment and Plan for details.            Again, thank you for allowing me to participate in the care of your patient.        Sincerely,        Kranthi Johnson MD

## 2019-12-04 NOTE — PROGRESS NOTES
[facility] Allergy Note    Allergy Clinic  [facility]  [address] [place] [address]    Encounter Date: Dec 4, 2019    CC:  No chief complaint on file.      History of Present Illness:  Ms. Sayra Atkinson is a 24 year old female who presents today for consultation for rash. The patient was referred by Lisseth Dunlap of [facility]. Pt reports that she has had eczema since childhood at about 4 years old. Mostly it appears as a rash on the face and upper extremities. Notes that it's also on the scalp. Worsened with a copper IUD placement about 3-4 years ago. Noted that the IUD was pure copper. She removed it. Then during the fall and while in large cities, her eczema will become quite severe.    She has exercise-induced asthma. Notes a family Hx of asthma. Was previously prescribed an inhaler which she hasn't had to use.  Reports having rhinitis with runny nose and red eyes when exposed to dogs, cats, and hamsters.    Admits to seasonal allergies to pollen worse in the fall and spring. Also states that she may be allergic to mold due to experiencing symptoms when visiting her parent's house which is quite old.    Had prick test done about 2-3 years ago and it was all negative. She uses sunscreen most of the time in the summer.     Notes she was prescribed dupixent about a month ago but still doesn't have it.    The patient showed me pictures from 2 years ago where there was a clear eczema in the decolletage, well-demarcated, on the neck, face and mostly around the eyes, and a little bit around the upper extremities, but really almost nothing on the lower trunk.     She takes Adderall and xanax for school. Takes also multivitamins, probiotics, zinc, Chased Crow . Occasionally gets steroids injected to the neck and back. Takes antihistamines when she visits her parents in [place]. Denies having done IgA for gluten.    Pt is a   at the [employer]. 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 No hobbies.      Past Medical History:   There is no problem list on file for this patient.    No past medical history on file.  No past surgical history on file.    Social History:  Pt is a  at the [employer]. 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 No hobbies.    Family History:  No family history on file.    Medications:  No current outpatient medications on file.       Allergies   Allergen Reactions     Acetaminophen Shortness Of Breath     Ibuprofen Shortness Of Breath     Azithromycin Hives     Copper Hives       Review of Systems:  -As per HPI  -Constitutional: Otherwise feeling well today, in usual state of health.  -Skin: As above in HPI. No additional skin concerns.    Physical exam:  Vitals: There were no vitals taken for this visit.  GEN: This is a well developed, well-nourished female in no acute distress, in a pleasant mood.    SKIN: Focused examination of the face and neck were performed.  - Face and neck clear.  -The patient showed pictures from 2 years ago where there was a clear eczema in the decolletage, well-demarcated, on the neck, face and mostly around the eyes, and a little bit around the upper extremities, but really almost nothing on the lower trunk.   -No other lesions of concern on areas examined.     Order for PATCH TESTS    [] Outpatient  [] Inpatient: [facility]..../ Bed ....      Skin Atopy (atopic dermatitis) [x] Yes   [] No  Rhinitis/Sinusitis:   [x] Yes   [] No  Allergic Asthma:   [x] Yes   [] No  Food Allergy:   [] Yes   [x] No no gluten (vomit), soy (throat closes, nauseous), quinoa (bloating)  Leg ulcers:   [] Yes   [x] No  Hand eczema:   [] Yes   [x] No   Leading hand:   [] R   [] L       [] Ambidextrous                      Reason for tests (suspected allergy): Recurrent eczemas mostly upper trunk and face and scalp  Known previous allergies: maybe metals, cobalt?  Nickel?    Standardized panels  [x] Standard panel (40 tests)  [x] Preservatives & Antimicrobials (31 tests)  [x] Emulsifiers & Additives (25 tests)   [x] Perfumes/Flavours & Plants (25 tests)  [] Hairdresser panel (12 tests)  [] Rubber Chemicals (22 tests)  [] Plastics (26 tests)  [] Colorants/Dyes/Food additives (20 tests)  [] Metals (implants/dental) (24 tests)  [] Local anaesthetics/NSAIDs (13 tests)  [] Antibiotics & Antimycotics (14 tests)   [] Corticosteroids (15 tests)   [] Photopatch test (62 tests)   [] others: ...      [] Patient's own products: ...    DO NOT test if chemical or biological identity is unknown!     always ask from patient the product information and safety sheets (MSDS)   [x] Atopy screen prick test (Atopic predisposition): also add soy and wheat. Also intradermal test if everything is negative (immediate and delayed) to molds    [] Patient needs consultation with Allergy team (changes of tests may apply)  [] Tests discussed with Allergy team (can have direct appointment for patch tests)    Atopy Screen (Placed 12/02/19 )    No Substance Readings (15 min) Evaluation   POS Histamine 1mg/ml ++    NEG NaCl 0.9% -      No Substance Readings (15 min) Evaluation   1 Alternaria alternata (tenuis)  -    2 Cladosporium herbarum -    3 Aspergillus fumigatus -    4 Penicillium notatum -    5 Dermatophagoides pteronyssinus +++    6 Dermatophagoides farinae +++/++++    7 Dog epithelium (canis spp) -    8 Cat hair (jordan catus) +    9 Cockroach   (Blatella americana & germanica) -    10 Grass mix midwest   (Claudia, Orchard, Redtop, Aba) -    11 Herminio grass (sorghum halepense) +    12 Weed mix   (common Cocklebur, Lamb s quarters, rough redroot Pigweed, Dock/Sorrel) ++    13 Mug wort (artemisia vulgare) +    14 Ragweed giant/short (ambrosia spp) ++++    15 English Plantain (plantago lanceolata) +    16 Tree mix 1 (Pecan, Maple BHR, Oak RVW, american Yellow Jacket, black Jasper) +/++    17 Red cedar  (juniperus virginia) -    18 Tree mix 2   (white Yoel, river/red Birch, black [place], common Sevier, american Elm) +    19 Box elder/Maple mix (acer spp) ++    20 Hickory shagbark (carya ovata) +/++    21 Wheat -     22 Soy -      Conclusion: Patient has a massive reaction to house dust mites, much less to animal dander. In addition, she reacts to weed pollen and less to grass and tree pollen. This means patient is atopic. Slight delayed reaction on 12/04/19 to D. Farinae.    RESULTS & EVALUATION of PATCH TESTS    Patch test readings after     [x] 2 days, [] 3 days [x] 4 days, [] 5 days,    Applied patch tests with results (import here the list of patch tests):  Order documented by: ANNA JADE LPN  Order reviewed by:  Physician:   Date/time of application:12-2-2019  Localization of application: back >> no lesions    STANDARD Series         No Substance 2 days 4 days remarks   1 Michael Mix [C] - -    2 Colophony - -    3  2-Mercaptobenzothiazole  - -     4 Methylisothiazolinone - -    5 Carba Mix - -    6 Thiuram Mix [A] NA NA    7 Bisphenol A Epoxy Resin - -    8 Q-Dpwc-Xnvfjzwactx-Formaldehyde Resin - -    9 Mercapto Mix [A] - -    10 Black Rubber Mix- PPD [B] NA NA    11 Potassium Dichromate  -  -    12 Balsam of [place] (Myroxylon Pereirae Resin) - -    13 Nickel Sulphate Hexahydrate - -    14 Mixed Dialkyl Thiourea - -    15 Paraben Mix [B] - -    16 Methyldibromo Glutaronitrile - -    17 Fragrance Mix ++ -    18 2-Bromo-2-Nitropropane-1,3-Diol (Bronopol) - -    19 Lyral - -    20 Tixocortol-21- Pivalate - -    21 Diazolidiyl Urea (Germall II) - -    22 Methyl Methacrylate NA NA    23 Cobalt (II) Chloride Hexahydrate - -    24 Fragrance Mix II  - -    25 Compositae Mix - -    26 Benzoyl Peroxide (+) -    27 Bacitracin - -    28 Formaldehyde - -    29 Methylchloroisothiazolinone / Methylisothiazolinone - -    30 Corticosteroid Mix - -    31 Sodium Lauryl Sulfate - -    32 Lanolin Alcohol - -    33  Turpentine - -    34 Cetylstearylalcohol - -    35 Chlorhexidine Dicluconate - -    36 Budenoside - -    37 Imidazolidinyl Urea  - -    38 Ethyl-2 Cyanoacrylate NA NA    39 Quaternium 15 (Dowicil 200) - -    40 Decyl Glucoside - -      EMULSIFIERS & ADDITIVES        No Substance 2 days 4 days remarks   41 Polyethylene Glycol-400 NA NA    42 Cocamidopropyl Betaine - -    43 Amerchol L101 NA NA    44 Propylene Glycol - -    45 Triethanolamine - -    46 Sorbitane Sesquiolate - -    47 Isopropylmyristate - -    48 Polysorbate 80  - -    49 Amidoamine   (Stearamidopropyl Dimethylamine) - -    50 Oleamidopropyl Dimethylamine - -    51 Lauryl Glucoside - -    52 Coconut Diethanolamide  - -    53 2-Hydroxy-4-Methoxy Benzophenone (Oxybenzone) - -    54 Benzophenone-4 (Sulisobenzon) - -    55 Propolis - -    56 Dexpanthenol - -    57 Carboxymethyl Cellulose Sodium - -    58 Abitol - -    59 Tert-Butylhydroquinone - -    60 Benzyl Salicylate - -     Antioxidant      61 Dodecyl Gallate - -    62 Butylhydroxyanisole (BHA) - -    63 Butylhydroxytoluene (BHT) - -    64 Di-Alpha-Tocopherol (Vit E) - -    65 Propyl Gallate - -      PERFUMES, FLAVORS & PLANTS         No Substance 2 days 4 days remarks   66 Benzyl Salicylate - -    67 Benzyl Cinnamate - -    68 Di-Limonene (Dipentene) NA NA    69 Cananga Odorata (Giovanni Davila) (I) + -    70 Lichen Acid Mix - -    71 Mentha Piperita Oil (Peppermint Oil) - -    72 Sesquiterpenelactone mix - -    73 Tea Tree Oil, Oxidized - -    74 Wood Tar Mix (+) -    75 Abietic Acid NA NA    76 Lavendula Angustifolia Oil (Lavender Oil) - -    77 Camphor  NA NA     Fragrance Mix I      78 Oakmoss Absolute - -    79 Eugenol - -    80 Geraniol - -    81 Hydroxycitronellal - -    82 Isoeugenol ++ -    83 Cinnamic Aldehyde - -    84 Cinnamic Alcohol  - -    85 Sorbitane Sesquioleate NA NA     Fragrance mix II      86 Citronellol - -    87 Alpha-Hexylcinnamic Aldehyde    - -    88 Citral - -    89 Farnesol -  -    90 Coumarin - -      PRESERVATIVES & ANTIMICROBIALS         No Substance 2 days 4 days remarks   91  1,2-Benzisothiazoline-3-One, Sodium Salt - -    92  1,3,5-Alec (2-Hydroxyethyl) - Hexahydrotriazine (Grotan BK) - -    93 8-Bdfxvhpgdqswx-7-Nitro-1, 3-Propanediol - -    94  3, 4, 4' - Triclocarban - -    95 4 - Chloro - 3 - Cresol - -    96 4 - Chloro - 4 - Xylenol (PCMX) - -    97 7-Ethylbicyclooxazolidine (Bioban FL6977) - -    98 Benzalkonium Chloride - -    99 Benzyl Alcohol - -    100 Cetalkonium Chloride - -    101 Cetylpyrimidine Chloride  - -    102 Chloroacetamide - -    103 DMDM Hydantoin - -    104 Glutaraldehyde NA NA    105 Triclosan - -    106 Glyoxal Trimeric Dihydrate - -    107 Iodopropynyl Butylcarbamate - -    109 Octylisothiazoline - -    109 Iodoform - -    110 (Nitrobutyl) Morpholine/(Ethylnitro-Trimethylene) Dimorpholine (Ecolibrium Solaran P 1487) - -    111 Phenoxyethanol - -    112 Phenyl Salicylate - -    113 Povidone Iodine - -    114 Sodium Benzoate - -    115 Sodium Disulfite NA NA    116 Sorbic Acid - -    117 Thimerosal - -     Parabens      118 Butyl-P-Hydroxybenzoate - -    119 Ethyl-P-Hydroxybenzoate - -    120 Methyl-P-Hydroxybenzoate NA NA    121 Propyl-P-Hydroxybenzoate - -       [] No relevant allergic reaction observed    [x] Allergic reaction diagnosed against: fragrance mix I, in particular, isoeugenol; ylang ylang; wood tar mix      Interpretation/ remarks:   See later    [] Patient information given   [] ACDS CAMP information's (# ....) to following compounds: .....   [] General information's to following compounds: ......    ==> final Diagnosis:      >> Atopic predisposition with     atopic dermatitis since childhood    Allergic rhinoconjunctivitis with relevant sensitization to dust mite and weed pollen, less grass and tree pollen    exercise-induced aggravation with asthma with sensitization to animal dander, pollens, dust mites, molds?    >> Flare-ups of eczemas on face,  scalp, wisam-orbital neck and decolletage and arms with:    Relevant delayed allergic contact sensitization to fragrances, incl Isoeugenol, YlangYlang = airborne contact dermatitis    In prick tests, slight delayed reaction to D. farinae after 2 days = atopic dermatitis    ==> Treatment prescribed/Plan:    See later    These conclusions are made at the best of one's knowledge and belief based on the provided evidence such as patient's history and allergy test results and they can change over time or can be incomplete because of missing information's.      CC Lisseth Dunlap MD  [facility]  [address]  [place] [address] on close of this encounter.    Follow-up for ongoing patch testing.    Scribe Disclosure  I, Kathy Odom, am serving as a scribe to document services personally performed by Dr. Kranthi Johnson MD, based on data collection and the provider's statements to me.    I spent a total of 15 minutes face to face with Sayra Atkinson during today s office visit. Over 50% of this time was spent counseling the patient and/or coordinating care. Please see Assessment and Plan for details.

## 2019-12-04 NOTE — NURSING NOTE
Chief Complaint   Patient presents with     Allergy Testing Followup     Patch testing day #3     Kaela Link LPN

## 2019-12-06 ENCOUNTER — OFFICE VISIT (OUTPATIENT)
Dept: ALLERGY | Facility: CLINIC | Age: 24
End: 2019-12-06
Payer: COMMERCIAL

## 2019-12-06 DIAGNOSIS — L23.89 ALLERGIC CONTACT DERMATITIS DUE TO OTHER AGENTS: Primary | ICD-10-CM

## 2019-12-06 DIAGNOSIS — J30.9 ALLERGIC RHINOCONJUNCTIVITIS: ICD-10-CM

## 2019-12-06 DIAGNOSIS — H10.10 ALLERGIC RHINOCONJUNCTIVITIS: ICD-10-CM

## 2019-12-06 NOTE — PROGRESS NOTES
[facility] Allergy Note    Allergy Clinic  [facility]  [address] [place] [id number]    Encounter Date: Dec 6, 2019    CC:  Chief Complaint   Patient presents with     Allergy Testing Followup     Sayra is here today for Patch Testing Day 5.       History of Present Illness:  Ms. Sayra Atkinson is a 24 year old female who presents today for consultation for rash. The patient was referred by Lisseth Dunlap of [facility]. Pt reports that she has had eczema since childhood at about 4 years old. Mostly it appears as a rash on the face and upper extremities. Notes that it's also on the scalp. Worsened with a copper IUD placement about 3-4 years ago. Noted that the IUD was pure copper. She removed it. Then during the fall and while in large cities, her eczema will become quite severe.    She has exercise-induced asthma. Notes a family Hx of asthma. Was previously prescribed an inhaler which she hasn't had to use.  Reports having rhinitis with runny nose and red eyes when exposed to dogs, cats, and hamsters.    Admits to seasonal allergies to pollen worse in the fall and spring. Also states that she may be allergic to mold due to experiencing symptoms when visiting her parent's house which is quite old.    Had prick test done about 2-3 years ago and it was all negative. She uses sunscreen most of the time in the summer.     Notes she was prescribed dupixent about a month ago but still doesn't have it.    The patient showed me pictures from 2 years ago where there was a clear eczema in the decolletage, well-demarcated, on the neck, face and mostly around the eyes, and a little bit around the upper extremities, but really almost nothing on the lower trunk.     She takes Adderall and xanax for school. Takes also multivitamins, probiotics, zinc, Chased Crow . Occasionally gets steroids injected to the neck and back. Takes  antihistamines when she visits her parents in [place]. Denies having done IgA for gluten.    Pt is a  at the [employer]. 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 No hobbies.      Past Medical History:   There is no problem list on file for this patient.    No past medical history on file.  No past surgical history on file.    Social History:  Pt is a  at the [employer]. 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 No hobbies.    Family History:  No family history on file.    Medications:  No current outpatient medications on file.       Allergies   Allergen Reactions     Acetaminophen Shortness Of Breath     Ibuprofen Shortness Of Breath     Azithromycin Hives     Copper Hives       Review of Systems:  -As per HPI  -Constitutional: Otherwise feeling well today, in usual state of health.  -Skin: As above in HPI. No additional skin concerns.    Physical exam:  Vitals: There were no vitals taken for this visit.  GEN: This is a well developed, well-nourished female in no acute distress, in a pleasant mood.    SKIN: Focused examination of the face and neck were performed.  - Face and neck clear.  -The patient showed pictures from 2 years ago where there was a clear eczema in the decolletage, well-demarcated, on the neck, face and mostly around the eyes, and a little bit around the upper extremities, but really almost nothing on the lower trunk.   -No other lesions of concern on areas examined.     Order for PATCH TESTS    [] Outpatient  [] Inpatient: Garcia..../ Bed ....      Skin Atopy (atopic dermatitis) [x] Yes   [] No  Rhinitis/Sinusitis:   [x] Yes   [] No  Allergic Asthma:   [x] Yes   [] No  Food Allergy:   [] Yes   [x] No no gluten (vomit), soy (throat closes, nauseous), quinoa (bloating)  Leg ulcers:   [] Yes   [x] No  Hand eczema:   [] Yes   [x] No   Leading hand:   [] R   [] L       [] Ambidextrous                      Reason for tests (suspected allergy): Recurrent  eczemas mostly upper trunk and face and scalp  Known previous allergies: maybe metals, cobalt? Nickel?    Standardized panels  [x] Standard panel (40 tests)  [x] Preservatives & Antimicrobials (31 tests)  [x] Emulsifiers & Additives (25 tests)   [x] Perfumes/Flavours & Plants (25 tests)  [] Hairdresser panel (12 tests)  [] Rubber Chemicals (22 tests)  [] Plastics (26 tests)  [] Colorants/Dyes/Food additives (20 tests)  [] Metals (implants/dental) (24 tests)  [] Local anaesthetics/NSAIDs (13 tests)  [] Antibiotics & Antimycotics (14 tests)   [] Corticosteroids (15 tests)   [] Photopatch test (62 tests)   [] others: ...      [] Patient's own products: ...    DO NOT test if chemical or biological identity is unknown!     always ask from patient the product information and safety sheets (MSDS)   [x] Atopy screen prick test (Atopic predisposition): also add soy and wheat. Also intradermal test if everything is negative (immediate and delayed) to molds    [] Patient needs consultation with Allergy team (changes of tests may apply)  [] Tests discussed with Allergy team (can have direct appointment for patch tests)    Atopy Screen (Placed 12/02/19 )    No Substance Readings (15 min) Evaluation   POS Histamine 1mg/ml ++    NEG NaCl 0.9% -      No Substance Readings (15 min) Evaluation   1 Alternaria alternata (tenuis)  -    2 Cladosporium herbarum -    3 Aspergillus fumigatus -    4 Penicillium notatum -    5 Dermatophagoides pteronyssinus +++    6 Dermatophagoides farinae +++/++++    7 Dog epithelium (canis spp) -    8 Cat hair (jordan catus) +    9 Cockroach   (Blatella americana & germanica) -    10 Grass mix midwest   (Claudia, Orchard, Redtop, Aba) -    11 Herminio grass (sorghum halepense) +    12 Weed mix   (common Cocklebur, Lamb s quarters, rough redroot Pigweed, Dock/Sorrel) ++    13 Mug wort (artemisia vulgare) +    14 Ragweed giant/short (ambrosia spp) ++++    15 English Plantain (plantago lanceolata) +    16 Tree  mix 1 (Pecan, Maple BHR, Oak RVW, american Henderson, black Bailey) +/++    17 Red cedar (juniperus virginia) -    18 Tree mix 2   (white Yoel, river/red Birch, black Sardis, common [place], american Elm) +    19 Box elder/Maple mix (acer spp) ++    20 Hickory shagbark (carya ovata) +/++    21 Wheat -     22 Soy -      Conclusion: Patient has a massive reaction to house dust mites, much less to animal dander. In addition, she reacts to weed pollen and less to grass and tree pollen. This means patient is atopic. Slight delayed reaction on 12/04/19 to D. Farinae. And D. pteronyssinus    RESULTS & EVALUATION of PATCH TESTS    Patch test readings after     [x] 2 days, [] 3 days [x] 4 days, [] 5 days,    Applied patch tests with results (import here the list of patch tests):  Order documented by: ANNA JADE LPN  Order reviewed by:  Physician:   Date/time of application:12-2-2019  Localization of application: back >> no lesions    STANDARD Series         No Substance 2 days 4 days remarks   1 Michael Mix [C] - -    2 Colophony - -    3  2-Mercaptobenzothiazole  - -     4 Methylisothiazolinone - -    5 Carba Mix - -    6 Thiuram Mix [A] NA NA    7 Bisphenol A Epoxy Resin - -    8 V-Pqfx-Ltrjdcnjcwy-Formaldehyde Resin - -    9 Mercapto Mix [A] - -    10 Black Rubber Mix- PPD [B] NA NA    11 Potassium Dichromate  -  -    12 Balsam of [place] (Myroxylon Pereirae Resin) - -    13 Nickel Sulphate Hexahydrate - -    14 Mixed Dialkyl Thiourea - -    15 Paraben Mix [B] - -    16 Methyldibromo Glutaronitrile - -    17 Fragrance Mix ++ ++/+++    18 2-Bromo-2-Nitropropane-1,3-Diol (Bronopol) - -    19 Lyral - -    20 Tixocortol-21- Pivalate - -    21 Diazolidiyl Urea (Germall II) - -    22 Methyl Methacrylate NA NA    23 Cobalt (II) Chloride Hexahydrate - (+)    24 Fragrance Mix II  - -    25 Compositae Mix - -    26 Benzoyl Peroxide (+) -    27 Bacitracin - -    28 Formaldehyde - -    29 Methylchloroisothiazolinone /  Methylisothiazolinone - -    30 Corticosteroid Mix - -    31 Sodium Lauryl Sulfate - + Clearly irritant   32 Lanolin Alcohol - +/++    33 Turpentine - -    34 Cetylstearylalcohol - -    35 Chlorhexidine Dicluconate - -    36 Budenoside - -    37 Imidazolidinyl Urea  - -    38 Ethyl-2 Cyanoacrylate NA NA    39 Quaternium 15 (Dowicil 200) - -    40 Decyl Glucoside - -      EMULSIFIERS & ADDITIVES        No Substance 2 days 4 days remarks   41 Polyethylene Glycol-400 NA NA    42 Cocamidopropyl Betaine - -    43 Amerchol L101 NA NA    44 Propylene Glycol - -    45 Triethanolamine - -    46 Sorbitane Sesquiolate - -    47 Isopropylmyristate - -    48 Polysorbate 80  - -    49 Amidoamine   (Stearamidopropyl Dimethylamine) - -    50 Oleamidopropyl Dimethylamine - -    51 Lauryl Glucoside - -    52 Coconut Diethanolamide  - -    53 2-Hydroxy-4-Methoxy Benzophenone (Oxybenzone) - -    54 Benzophenone-4 (Sulisobenzon) - -    55 Propolis - -    56 Dexpanthenol - -    57 Carboxymethyl Cellulose Sodium - -    58 Abitol - -    59 Tert-Butylhydroquinone - -    60 Benzyl Salicylate - -     Antioxidant      61 Dodecyl Gallate - -    62 Butylhydroxyanisole (BHA) - -    63 Butylhydroxytoluene (BHT) - -    64 Di-Alpha-Tocopherol (Vit E) - -    65 Propyl Gallate - -      PERFUMES, FLAVORS & PLANTS         No Substance 2 days 4 days remarks   66 Benzyl Salicylate - -    67 Benzyl Cinnamate - -    68 Di-Limonene (Dipentene) NA NA    69 Cananga Odorata (Giovanni Davila) (I) + ++    70 Lichen Acid Mix - -    71 Mentha Piperita Oil (Peppermint Oil) - -    72 Sesquiterpenelactone mix - -    73 Tea Tree Oil, Oxidized - -    74 Wood Tar Mix (+) +/++    75 Abietic Acid NA NA    76 Lavendula Angustifolia Oil (Lavender Oil) - -    77 Camphor  NA NA     Fragrance Mix I      78 Oakmoss Absolute - -    79 Eugenol - -    80 Geraniol - -    81 Hydroxycitronellal - -    82 Isoeugenol ++ ++/+++    83 Cinnamic Aldehyde - -    84 Cinnamic Alcohol  - -    85  Sorbitane Sesquioleate NA NA     Fragrance mix II      86 Citronellol - -    87 Alpha-Hexylcinnamic Aldehyde    - -    88 Citral - -    89 Farnesol - -    90 Coumarin - -      PRESERVATIVES & ANTIMICROBIALS         No Substance 2 days 4 days remarks   91  1,2-Benzisothiazoline-3-One, Sodium Salt - -    92  1,3,5-Alec (2-Hydroxyethyl) - Hexahydrotriazine (Grotan BK) - -    93 3-Iiuirsilkwnik-0-Nitro-1, 3-Propanediol - -    94  3, 4, 4' - Triclocarban - -    95 4 - Chloro - 3 - Cresol - -    96 4 - Chloro - 4 - Xylenol (PCMX) - -    97 7-Ethylbicyclooxazolidine (MerchantCirclean [id number]) - -    98 Benzalkonium Chloride - -    99 Benzyl Alcohol - -    100 Cetalkonium Chloride - -    101 Cetylpyrimidine Chloride  - -    102 Chloroacetamide - -    103 DMDM Hydantoin - -    104 Glutaraldehyde NA NA    105 Triclosan - -    106 Glyoxal Trimeric Dihydrate - -    107 Iodopropynyl Butylcarbamate - -    109 Octylisothiazoline - -    109 Iodoform - -    110 (Nitrobutyl) Morpholine/(Ethylnitro-Trimethylene) Dimorpholine (Bioban P 1487) - -    111 Phenoxyethanol - -    112 Phenyl Salicylate - -    113 Povidone Iodine - -    114 Sodium Benzoate - -    115 Sodium Disulfite NA NA    116 Sorbic Acid - -    117 Thimerosal - -     Parabens      118 Butyl-P-Hydroxybenzoate - -    119 Ethyl-P-Hydroxybenzoate - -    120 Methyl-P-Hydroxybenzoate NA NA    121 Propyl-P-Hydroxybenzoate - -      [x] Allergic reaction diagnosed against:   Standard:  Fragrance Mix +/++  Lanolin Alcohol +    Perfumes, flavors, and plants:  Cananga Odorata (Giovanni Davila) (I) ++  Wood Tar Mix +/++  Isoeugenol ++/+++      Interpretation/ remarks:   Patient has a clinically relevant contact sensitization to fragrance and similar products (isoeugenol and wood tar mix). In addition, there is a slight reaction to the metal cobalt and to the lanolin. However, the atopic skin is hyperirritable; that has been shown by the slightly positive reaction to sodium lauryl sulfate and  therefore not totally sure.    [x] Patient information given   [x] ACDS CAMP information's (# [id number], and [id number]) to following compounds: Fragrance Mix +/++, Lanolin Alcohol +, Cananga Odorata (Ylang Ylang) (I) ++, Isoeugenol ++/+++. CAMP marissa installed in the clinic.    ==> final Diagnosis:    >> Atopic predisposition with     atopic dermatitis since childhood    Allergic rhinoconjunctivitis with relevant sensitization to dust mite and weed pollen, less grass and tree pollen    exercise-induced aggravation with asthma with sensitization to animal dander, pollens, dust mites, molds?    >> Flare-ups of eczemas on face, scalp, wisam-orbital neck and decolletage and arms with:    Relevant delayed allergic contact sensitization to fragrances, incl Isoeugenol, YlangYlang = airborne contact dermatitis    In prick tests, slight delayed reaction to D. farinae after 2 days = atopic dermatitis    ==> Treatment prescribed/Plan:    Follow the recommendations of the CAMP marissa and use only products on the CAMP marissa.    Continue with regular moisturizer (on the CAMP marissa) due to atopic eczema    Refer the patient back to referring provider at [facility]    Patient has a severe atopic dermatitis; we think that dupixent is the most specific medication for that. Methotrexate is not the treatment of choice for an atopic dermatitis. Actually the use of immune suppressants of methotrexate, Cellcept or cyclosporin are not recommended because they are not very specific for atopic dermatitis and have much more long term side effects than dupixent. However, I would propose in the moment to observe the eczema after we reduce all possible contact allergens.    These conclusions are made at the best of one's knowledge and belief based on the provided evidence such as patient's history and allergy test results and they can change over time or can be incomplete because of missing information's.      CC Lisseth Dunlap,  MD [facility]  [address] [facility] [address]  [place] [address] on close of this encounter.    Follow-up with referring provider.      Staff Involved:    Scribe Disclosure  I, Kathy Espinosa, am serving as a scribe to document services personally performed by Dr. Kranthi Johnson, based on data collection and the provider's statements to me.     I spent a total of 25 minutes face to face with Sayra Atkinson during today s office visit. Over 50% of this time was spent concluding and discussing the results, installing and explaining the CAMP Raphael, counseling the patient and/or coordinating care. Please see Assessment and Plan for details.

## 2019-12-06 NOTE — PATIENT INSTRUCTIONS
RESULTS & EVALUATION of PATCH TESTS    Patch test readings after     [x] 2 days, [] 3 days [x] 4 days, [] 5 days,    Applied patch tests with results (import here the list of patch tests):  Order documented by: ANNA JADE LPN  Order reviewed by:  Physician:   Date/time of application:12-2-2019  Localization of application: back >> no lesions    STANDARD Series         No Substance 2 days 4 days remarks   1 Michael Mix [C] - -    2 Colophony - -    3  2-Mercaptobenzothiazole  - -     4 Methylisothiazolinone - -    5 Carba Mix - -    6 Thiuram Mix [A] NA NA    7 Bisphenol A Epoxy Resin - -    8 M-Ndst-Koeaeupbjiu-Formaldehyde Resin - -    9 Mercapto Mix [A] - -    10 Black Rubber Mix- PPD [B] NA NA    11 Potassium Dichromate  -  -    12 Balsam of [place] (Myroxylon Pereirae Resin) - -    13 Nickel Sulphate Hexahydrate - -    14 Mixed Dialkyl Thiourea - -    15 Paraben Mix [B] - -    16 Methyldibromo Glutaronitrile - -    17 Fragrance Mix ++ ++/+++    18 2-Bromo-2-Nitropropane-1,3-Diol (Bronopol) - -    19 Lyral - -    20 Tixocortol-21- Pivalate - -    21 Diazolidiyl Urea (Germall II) - -    22 Methyl Methacrylate NA NA    23 Cobalt (II) Chloride Hexahydrate - (+)    24 Fragrance Mix II  - -    25 Compositae Mix - -    26 Benzoyl Peroxide (+) -    27 Bacitracin - -    28 Formaldehyde - -    29 Methylchloroisothiazolinone / Methylisothiazolinone - -    30 Corticosteroid Mix - -    31 Sodium Lauryl Sulfate - + Clearly irritant   32 Lanolin Alcohol - +/++    33 Turpentine - -    34 Cetylstearylalcohol - -    35 Chlorhexidine Dicluconate - -    36 Budenoside - -    37 Imidazolidinyl Urea  - -    38 Ethyl-2 Cyanoacrylate NA NA    39 Quaternium 15 (Dowicil 200) - -    40 Decyl Glucoside - -      EMULSIFIERS & ADDITIVES        No Substance 2 days 4 days remarks   41 Polyethylene Glycol-400 NA NA    42 Cocamidopropyl Betaine - -    43 Amerchol L101 NA NA    44 Propylene Glycol - -    45 Triethanolamine - -    46  Sorbitane Sesquiolate - -    47 Isopropylmyristate - -    48 Polysorbate 80  - -    49 Amidoamine   (Stearamidopropyl Dimethylamine) - -    50 Oleamidopropyl Dimethylamine - -    51 Lauryl Glucoside - -    52 Coconut Diethanolamide  - -    53 2-Hydroxy-4-Methoxy Benzophenone (Oxybenzone) - -    54 Benzophenone-4 (Sulisobenzon) - -    55 Propolis - -    56 Dexpanthenol - -    57 Carboxymethyl Cellulose Sodium - -    58 Abitol - -    59 Tert-Butylhydroquinone - -    60 Benzyl Salicylate - -     Antioxidant      61 Dodecyl Gallate - -    62 Butylhydroxyanisole (BHA) - -    63 Butylhydroxytoluene (BHT) - -    64 Di-Alpha-Tocopherol (Vit E) - -    65 Propyl Gallate - -      PERFUMES, FLAVORS & PLANTS         No Substance 2 days 4 days remarks   66 Benzyl Salicylate - -    67 Benzyl Cinnamate - -    68 Di-Limonene (Dipentene) NA NA    69 Cananga Odorata (Giovanni Davila) (I) + ++    70 Lichen Acid Mix - -    71 Mentha Piperita Oil (Peppermint Oil) - -    72 Sesquiterpenelactone mix - -    73 Tea Tree Oil, Oxidized - -    74 Wood Tar Mix (+) +/++    75 Abietic Acid NA NA    76 Lavendula Angustifolia Oil (Lavender Oil) - -    77 Camphor  NA NA     Fragrance Mix I      78 Oakmoss Absolute - -    79 Eugenol - -    80 Geraniol - -    81 Hydroxycitronellal - -    82 Isoeugenol ++ ++/+++    83 Cinnamic Aldehyde - -    84 Cinnamic Alcohol  - -    85 Sorbitane Sesquioleate NA NA     Fragrance mix II      86 Citronellol - -    87 Alpha-Hexylcinnamic Aldehyde    - -    88 Citral - -    89 Farnesol - -    90 Coumarin - -      PRESERVATIVES & ANTIMICROBIALS         No Substance 2 days 4 days remarks   91  1,2-Benzisothiazoline-3-One, Sodium Salt - -    92  1,3,5-Alec (2-Hydroxyethyl) - Hexahydrotriazine (Grotan BK) - -    93 6-Vkdizyiwfppyq-8-Nitro-1, 3-Propanediol - -    94  3, 4, 4' - Triclocarban - -    95 4 - Chloro - 3 - Cresol - -    96 4 - Chloro - 4 - Xylenol (PCMX) - -    97 7-Ethylbicyclooxazolidine ([facility] [id number]) - -    98  Benzalkonium Chloride - -    99 Benzyl Alcohol - -    100 Cetalkonium Chloride - -    101 Cetylpyrimidine Chloride  - -    102 Chloroacetamide - -    103 DMDM Hydantoin - -    104 Glutaraldehyde NA NA    105 Triclosan - -    106 Glyoxal Trimeric Dihydrate - -    107 Iodopropynyl Butylcarbamate - -    109 Octylisothiazoline - -    109 Iodoform - -    110 (Nitrobutyl) Morpholine/(Ethylnitro-Trimethylene) Dimorpholine (Bioban P 1487) - -    111 Phenoxyethanol - -    112 Phenyl Salicylate - -    113 Povidone Iodine - -    114 Sodium Benzoate - -    115 Sodium Disulfite NA NA    116 Sorbic Acid - -    117 Thimerosal - -     Parabens      118 Butyl-P-Hydroxybenzoate - -    119 Ethyl-P-Hydroxybenzoate - -    120 Methyl-P-Hydroxybenzoate NA NA    121 Propyl-P-Hydroxybenzoate - -      [x] Allergic reaction diagnosed against:   Standard:  Fragrance Mix +/++  Lanolin Alcohol +    Perfumes, flavors, and plants:  Cananga Odorata (Ylang Ylang) (I) ++  Wood Tar Mix +/++  Isoeugenol ++/+++      Interpretation/ remarks:   Patient has a clinically relevant contact sensitization to fragrance and similar products (isoeugenol and wood tar mix). In addition, there is a slight reaction to the metal cobalt and to the lanolin. However, the atopic skin is hyperirritable; that has been shown by the slightly positive reaction to sodium lauryl sulfate and therefore not totally sure.    [x] Patient information given   [x] ACDS CAMP information's (# [id number], and [id number]) to following compounds: Fragrance Mix +/++, Lanolin Alcohol +, Cananga Odorata (Ylang Ylang) (I) ++, Isoeugenol ++/+++. CAMP marissa installed in the clinic.    ==> final Diagnosis:    >> Atopic predisposition with     atopic dermatitis since childhood    Allergic rhinoconjunctivitis with relevant sensitization to dust mite and weed pollen, less grass and tree pollen    exercise-induced aggravation with asthma with sensitization to animal dander, pollens, dust mites,  molds?    >> Flare-ups of eczemas on face, scalp, wisam-orbital neck and decolletage and arms with:    Relevant delayed allergic contact sensitization to fragrances, incl Isoeugenol, YlangYlang = airborne contact dermatitis    In prick tests, slight delayed reaction to D. farinae after 2 days = atopic dermatitis    ==> Treatment prescribed/Plan:    Follow the recommendations of the CAMP marissa and use only products on the CAMP marissa.    Continue with regular moisturizer (on the CAMP marissa) due to atopic eczema    Refer the patient back to referring provider at [facility]    Patient has a severe atopic dermatitis; we think that dupixent is the most specific medication for that. Methotrexate is not the treatment of choice for an atopic dermatitis. Actually the use of immune suppressants of methotrexate, Cellcept or cyclosporin are not recommended because they are not very specific for atopic dermatitis and have much more long term side effects than dupixent. However, I would propose in the moment to observe the eczema after we reduce all possible contact allergens.    These conclusions are made at the best of one's knowledge and belief based on the provided evidence such as patient's history and allergy test results and they can change over time or can be incomplete because of missing information's.      CC Lisseth Dunlap MD  [facility]  [address]  [place] [address] on close of this encounter.    Follow-up with referring provider.

## 2019-12-06 NOTE — LETTER
12/6/2019         RE: Sayra Atkinson  [address] [address]        Dear Colleague,    Thank you for referring your patient, Sayra Atkinson, to the [facility] ALLERGY. Please see a copy of my visit note below.    [facility] Allergy Note    Allergy Clinic  [facility]  Cl[facility] and [facility]  [address] [place] [id number]    Encounter Date: Dec 6, 2019    CC:  Chief Complaint   Patient presents with     Allergy Testing Followup     Sayra is here today for Patch Testing Day 5.       History of Present Illness:  Ms. Sayra Atkinson is a 24 year old female who presents today for consultation for rash. The patient was referred by Lisseth Dunlap of [facility]. Pt reports that she has had eczema since childhood at about 4 years old. Mostly it appears as a rash on the face and upper extremities. Notes that it's also on the scalp. Worsened with a copper IUD placement about 3-4 years ago. Noted that the IUD was pure copper. She removed it. Then during the fall and while in large cities, her eczema will become quite severe.    She has exercise-induced asthma. Notes a family Hx of asthma. Was previously prescribed an inhaler which she hasn't had to use.  Reports having rhinitis with runny nose and red eyes when exposed to dogs, cats, and hamsters.    Admits to seasonal allergies to pollen worse in the fall and spring. Also states that she may be allergic to mold due to experiencing symptoms when visiting her parent's house which is quite old.    Had prick test done about 2-3 years ago and it was all negative. She uses sunscreen most of the time in the summer.     Notes she was prescribed dupixent about a month ago but still doesn't have it.    The patient showed me pictures from 2 years ago where there was a clear eczema in the decolletage, well-demarcated, on the neck, face and mostly around the eyes, and a little bit around the  upper extremities, but really almost nothing on the lower trunk.     She takes Adderall and xanax for school. Takes also multivitamins, probiotics, zinc, Chased Crow . Occasionally gets steroids injected to the neck and back. Takes antihistamines when she visits her parents in [place]. Denies having done IgA for gluten.    Pt is a  at the [employer]. 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 No hobbies.      Past Medical History:   There is no problem list on file for this patient.    No past medical history on file.  No past surgical history on file.    Social History:  Pt is a  at the [employer]. 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 No hobbies.    Family History:  No family history on file.    Medications:  No current outpatient medications on file.       Allergies   Allergen Reactions     Acetaminophen Shortness Of Breath     Ibuprofen Shortness Of Breath     Azithromycin Hives     Copper Hives       Review of Systems:  -As per HPI  -Constitutional: Otherwise feeling well today, in usual state of health.  -Skin: As above in HPI. No additional skin concerns.    Physical exam:  Vitals: There were no vitals taken for this visit.  GEN: This is a well developed, well-nourished female in no acute distress, in a pleasant mood.    SKIN: Focused examination of the face and neck were performed.  - Face and neck clear.  -The patient showed pictures from 2 years ago where there was a clear eczema in the decolletage, well-demarcated, on the neck, face and mostly around the eyes, and a little bit around the upper extremities, but really almost nothing on the lower trunk.   -No other lesions of concern on areas examined.     Order for PATCH TESTS    [] Outpatient  [] Inpatient: Garcia..../ Bed ....      Skin Atopy (atopic dermatitis) [x] Yes   [] No  Rhinitis/Sinusitis:   [x] Yes   [] No  Allergic Asthma:   [x] Yes   [] No  Food Allergy:   [] Yes   [x] No no gluten (vomit),  soy (throat closes, nauseous), quinoa (bloating)  Leg ulcers:   [] Yes   [x] No  Hand eczema:   [] Yes   [x] No   Leading hand:   [] R   [] L       [] Ambidextrous                      Reason for tests (suspected allergy): Recurrent eczemas mostly upper trunk and face and scalp  Known previous allergies: maybe metals, cobalt? Nickel?    Standardized panels  [x] Standard panel (40 tests)  [x] Preservatives & Antimicrobials (31 tests)  [x] Emulsifiers & Additives (25 tests)   [x] Perfumes/Flavours & Plants (25 tests)  [] Hairdresser panel (12 tests)  [] Rubber Chemicals (22 tests)  [] Plastics (26 tests)  [] Colorants/Dyes/Food additives (20 tests)  [] Metals (implants/dental) (24 tests)  [] Local anaesthetics/NSAIDs (13 tests)  [] Antibiotics & Antimycotics (14 tests)   [] Corticosteroids (15 tests)   [] Photopatch test (62 tests)   [] others: ...      [] Patient's own products: ...    DO NOT test if chemical or biological identity is unknown!     always ask from patient the product information and safety sheets (MSDS)   [x] Atopy screen prick test (Atopic predisposition): also add soy and wheat. Also intradermal test if everything is negative (immediate and delayed) to molds    [] Patient needs consultation with Allergy team (changes of tests may apply)  [] Tests discussed with Allergy team (can have direct appointment for patch tests)    Atopy Screen (Placed 12/02/19 )    No Substance Readings (15 min) Evaluation   POS Histamine 1mg/ml ++    NEG NaCl 0.9% -      No Substance Readings (15 min) Evaluation   1 Alternaria alternata (tenuis)  -    2 Cladosporium herbarum -    3 Aspergillus fumigatus -    4 Penicillium notatum -    5 Dermatophagoides pteronyssinus +++    6 Dermatophagoides farinae +++/++++    7 Dog epithelium (canis spp) -    8 Cat hair (jordan catus) +    9 Cockroach   (Blatella americana & germanica) -    10 Grass mix midwest   (Claudia, Orchard, Redtop, Aba) -    11 Herminio grass (sorghum halepense) +     12 Weed mix   (common Cocklebur, Lamb s quarters, rough redroot Pigweed, Dock/Sorrel) ++    13 Mug wort (artemisia vulgare) +    14 Ragweed giant/short (ambrosia spp) ++++    15 English Plantain (plantago lanceolata) +    16 Tree mix 1 (Pecan, Maple BHR, Oak RVW, american [place], black Stonewall) +/++    17 Red cedar (juniperus virginia) -    18 Tree mix 2   (white Yoel, river/red Birch, black [place], common Amazonia, american Elm) +    19 Box elder/Maple mix (acer spp) ++    20 Hickory shagbark (carya ovata) +/++    21 Wheat -     22 Soy -      Conclusion: Patient has a massive reaction to house dust mites, much less to animal dander. In addition, she reacts to weed pollen and less to grass and tree pollen. This means patient is atopic. Slight delayed reaction on 12/04/19 to D. Farinae. And D. pteronyssinus    RESULTS & EVALUATION of PATCH TESTS    Patch test readings after     [x] 2 days, [] 3 days [x] 4 days, [] 5 days,    Applied patch tests with results (import here the list of patch tests):  Order documented by: ANNA JADE LPN  Order reviewed by:  Physician:   Date/time of application:12-2-2019  Localization of application: back >> no lesions    STANDARD Series         No Substance 2 days 4 days remarks   1 Michael Mix [C] - -    2 Colophony - -    3  2-Mercaptobenzothiazole  - -     4 Methylisothiazolinone - -    5 Carba Mix - -    6 Thiuram Mix [A] NA NA    7 Bisphenol A Epoxy Resin - -    8 N-Zhfy-Geetkpkjotp-Formaldehyde Resin - -    9 Mercapto Mix [A] - -    10 Black Rubber Mix- PPD [B] NA NA    11 Potassium Dichromate  -  -    12 Balsam of [place] (Myroxylon Pereirae Resin) - -    13 Nickel Sulphate Hexahydrate - -    14 Mixed Dialkyl Thiourea - -    15 Paraben Mix [B] - -    16 Methyldibromo Glutaronitrile - -    17 Fragrance Mix ++ ++/+++    18 2-Bromo-2-Nitropropane-1,3-Diol (Bronopol) - -    19 Lyral - -    20 Tixocortol-21- Pivalate - -    21 Diazolidiyl Urea (Germall II) - -    22  Methyl Methacrylate NA NA    23 Cobalt (II) Chloride Hexahydrate - (+)    24 Fragrance Mix II  - -    25 Compositae Mix - -    26 Benzoyl Peroxide (+) -    27 Bacitracin - -    28 Formaldehyde - -    29 Methylchloroisothiazolinone / Methylisothiazolinone - -    30 Corticosteroid Mix - -    31 Sodium Lauryl Sulfate - + Clearly irritant   32 Lanolin Alcohol - +/++    33 Turpentine - -    34 Cetylstearylalcohol - -    35 Chlorhexidine Dicluconate - -    36 Budenoside - -    37 Imidazolidinyl Urea  - -    38 Ethyl-2 Cyanoacrylate NA NA    39 Quaternium 15 (Dowicil 200) - -    40 Decyl Glucoside - -      EMULSIFIERS & ADDITIVES        No Substance 2 days 4 days remarks   41 Polyethylene Glycol-400 NA NA    42 Cocamidopropyl Betaine - -    43 Amerchol L101 NA NA    44 Propylene Glycol - -    45 Triethanolamine - -    46 Sorbitane Sesquiolate - -    47 Isopropylmyristate - -    48 Polysorbate 80  - -    49 Amidoamine   (Stearamidopropyl Dimethylamine) - -    50 Oleamidopropyl Dimethylamine - -    51 Lauryl Glucoside - -    52 Coconut Diethanolamide  - -    53 2-Hydroxy-4-Methoxy Benzophenone (Oxybenzone) - -    54 Benzophenone-4 (Sulisobenzon) - -    55 Propolis - -    56 Dexpanthenol - -    57 Carboxymethyl Cellulose Sodium - -    58 Abitol - -    59 Tert-Butylhydroquinone - -    60 Benzyl Salicylate - -     Antioxidant      61 Dodecyl Gallate - -    62 Butylhydroxyanisole (BHA) - -    63 Butylhydroxytoluene (BHT) - -    64 Di-Alpha-Tocopherol (Vit E) - -    65 Propyl Gallate - -      PERFUMES, FLAVORS & PLANTS         No Substance 2 days 4 days remarks   66 Benzyl Salicylate - -    67 Benzyl Cinnamate - -    68 Di-Limonene (Dipentene) NA NA    69 Cananga Odorata (Ylang Ylang) (I) + ++    70 Lichen Acid Mix - -    71 Mentha Piperita Oil (Peppermint Oil) - -    72 Sesquiterpenelactone mix - -    73 Tea Tree Oil, Oxidized - -    74 Wood Tar Mix (+) +/++    75 Abietic Acid NA NA    76 Lavendula Angustifolia Oil (Lavender  Oil) - -    77 Camphor  NA NA     Fragrance Mix I      78 Oakmoss Absolute - -    79 Eugenol - -    80 Geraniol - -    81 Hydroxycitronellal - -    82 Isoeugenol ++ ++/+++    83 Cinnamic Aldehyde - -    84 Cinnamic Alcohol  - -    85 Sorbitane Sesquioleate NA NA     Fragrance mix II      86 Citronellol - -    87 Alpha-Hexylcinnamic Aldehyde    - -    88 Citral - -    89 Farnesol - -    90 Coumarin - -      PRESERVATIVES & ANTIMICROBIALS         No Substance 2 days 4 days remarks   91  1,2-Benzisothiazoline-3-One, Sodium Salt - -    92  1,3,5-Alec (2-Hydroxyethyl) - Hexahydrotriazine (Grotan BK) - -    93 3-Dahclaytrxffd-3-Nitro-1, 3-Propanediol - -    94  3, 4, 4' - Triclocarban - -    95 4 - Chloro - 3 - Cresol - -    96 4 - Chloro - 4 - Xylenol (PCMX) - -    97 7-Ethylbicyclooxazolidine (Battleproan NH1157) - -    98 Benzalkonium Chloride - -    99 Benzyl Alcohol - -    100 Cetalkonium Chloride - -    101 Cetylpyrimidine Chloride  - -    102 Chloroacetamide - -    103 DMDM Hydantoin - -    104 Glutaraldehyde NA NA    105 Triclosan - -    106 Glyoxal Trimeric Dihydrate - -    107 Iodopropynyl Butylcarbamate - -    109 Octylisothiazoline - -    109 Iodoform - -    110 (Nitrobutyl) Morpholine/(Ethylnitro-Trimethylene) Dimorpholine (Bioban P 1487) - -    111 Phenoxyethanol - -    112 Phenyl Salicylate - -    113 Povidone Iodine - -    114 Sodium Benzoate - -    115 Sodium Disulfite NA NA    116 Sorbic Acid - -    117 Thimerosal - -     Parabens      118 Butyl-P-Hydroxybenzoate - -    119 Ethyl-P-Hydroxybenzoate - -    120 Methyl-P-Hydroxybenzoate NA NA    121 Propyl-P-Hydroxybenzoate - -      [x] Allergic reaction diagnosed against:   Standard:  Fragrance Mix +/++  Lanolin Alcohol +    Perfumes, flavors, and plants:  Cananga Odorata (Bonyang Bonyang) (I) ++  Wood Tar Mix +/++  Isoeugenol ++/+++      Interpretation/ remarks:   Patient has a clinically relevant contact sensitization to fragrance and similar products (isoeugenol  and wood tar mix). In addition, there is a slight reaction to the metal cobalt and to the lanolin. However, the atopic skin is hyperirritable; that has been shown by the slightly positive reaction to sodium lauryl sulfate and therefore not totally sure.    [x] Patient information given   [x] ACDS CAMP information's (# [id number], and [id number]) to following compounds: Fragrance Mix +/++, Lanolin Alcohol +, Cananga Odorata (Ylang Ylang) (I) ++, Isoeugenol ++/+++. CAMP marissa installed in the clinic.    ==> final Diagnosis:    >> Atopic predisposition with     atopic dermatitis since childhood    Allergic rhinoconjunctivitis with relevant sensitization to dust mite and weed pollen, less grass and tree pollen    exercise-induced aggravation with asthma with sensitization to animal dander, pollens, dust mites, molds?    >> Flare-ups of eczemas on face, scalp, wisam-orbital neck and decolletage and arms with:    Relevant delayed allergic contact sensitization to fragrances, incl Isoeugenol, YlangYlang = airborne contact dermatitis    In prick tests, slight delayed reaction to D. farinae after 2 days = atopic dermatitis    ==> Treatment prescribed/Plan:    Follow the recommendations of the CAMP marissa and use only products on the CAMP marissa.    Continue with regular moisturizer (on the CAMP marissa) due to atopic eczema    Refer the patient back to referring provider at [facility]    Patient has a severe atopic dermatitis; we think that dupixent is the most specific medication for that. Methotrexate is not the treatment of choice for an atopic dermatitis. Actually the use of immune suppressants of methotrexate, Cellcept or cyclosporin are not recommended because they are not very specific for atopic dermatitis and have much more long term side effects than dupixent. However, I would propose in the moment to observe the eczema after we reduce all possible contact allergens.    These conclusions are made at the best  of one's knowledge and belief based on the provided evidence such as patient's history and allergy test results and they can change over time or can be incomplete because of missing information's.      CC Lisseth Dunlap MD  [facility]  [address] [facility] [address]  [place] [address] on close of this encounter.    Follow-up with referring provider.      Staff Involved:    Scribe Disclosure  I, Kathy Jesús, am serving as a scribe to document services personally performed by Dr. Kranthi Johnson, based on data collection and the provider's statements to me.     I spent a total of 25 minutes face to face with Sayra Atkinson during today s office visit. Over 50% of this time was spent concluding and discussing the results, installing and explaining the CAMP Raphael, counseling the patient and/or coordinating care. Please see Assessment and Plan for details.      Again, thank you for allowing me to participate in the care of your patient.        Sincerely,        Kranthi Johnson MD

## 2020-03-11 ENCOUNTER — HEALTH MAINTENANCE LETTER (OUTPATIENT)
Age: 25
End: 2020-03-11

## 2021-01-04 ENCOUNTER — HEALTH MAINTENANCE LETTER (OUTPATIENT)
Age: 26
End: 2021-01-04

## 2021-04-25 ENCOUNTER — HEALTH MAINTENANCE LETTER (OUTPATIENT)
Age: 26
End: 2021-04-25

## 2021-10-10 ENCOUNTER — HEALTH MAINTENANCE LETTER (OUTPATIENT)
Age: 26
End: 2021-10-10

## 2022-05-22 ENCOUNTER — HEALTH MAINTENANCE LETTER (OUTPATIENT)
Age: 27
End: 2022-05-22

## 2022-09-18 ENCOUNTER — HEALTH MAINTENANCE LETTER (OUTPATIENT)
Age: 27
End: 2022-09-18

## 2023-06-04 ENCOUNTER — HEALTH MAINTENANCE LETTER (OUTPATIENT)
Age: 28
End: 2023-06-04

## 2024-08-12 ENCOUNTER — APPOINTMENT (OUTPATIENT)
Dept: ULTRASOUND IMAGING | Age: 29
End: 2024-08-12
Attending: EMERGENCY MEDICINE

## 2024-08-12 ENCOUNTER — HOSPITAL ENCOUNTER (EMERGENCY)
Age: 29
Discharge: HOME | End: 2024-08-12
Attending: EMERGENCY MEDICINE

## 2024-08-12 ENCOUNTER — WALK IN (OUTPATIENT)
Dept: URGENT CARE | Age: 29
End: 2024-08-12

## 2024-08-12 VITALS
SYSTOLIC BLOOD PRESSURE: 105 MMHG | HEART RATE: 75 BPM | DIASTOLIC BLOOD PRESSURE: 70 MMHG | RESPIRATION RATE: 16 BRPM | TEMPERATURE: 98.9 F | OXYGEN SATURATION: 97 %

## 2024-08-12 VITALS
RESPIRATION RATE: 18 BRPM | WEIGHT: 127.87 LBS | TEMPERATURE: 98.6 F | SYSTOLIC BLOOD PRESSURE: 113 MMHG | DIASTOLIC BLOOD PRESSURE: 63 MMHG | BODY MASS INDEX: 22.12 KG/M2 | OXYGEN SATURATION: 99 % | HEART RATE: 78 BPM

## 2024-08-12 DIAGNOSIS — M79.662 PAIN OF LEFT CALF: Primary | ICD-10-CM

## 2024-08-12 DIAGNOSIS — M79.669 CALF PAIN, UNSPECIFIED LATERALITY: Primary | ICD-10-CM

## 2024-08-12 LAB
ANION GAP SERPL CALC-SCNC: 11 MMOL/L (ref 7–19)
BASOPHILS # BLD: 0 K/MCL (ref 0–0.3)
BASOPHILS NFR BLD: 1 %
BUN SERPL-MCNC: 17 MG/DL (ref 6–20)
BUN/CREAT SERPL: 25 (ref 7–25)
CALCIUM SERPL-MCNC: 8.8 MG/DL (ref 8.4–10.2)
CHLORIDE SERPL-SCNC: 103 MMOL/L (ref 97–110)
CK SERPL-CCNC: 55 UNITS/L (ref 26–192)
CO2 SERPL-SCNC: 27 MMOL/L (ref 21–32)
CREAT SERPL-MCNC: 0.69 MG/DL (ref 0.51–0.95)
DEPRECATED RDW RBC: 41.2 FL (ref 39–50)
EGFRCR SERPLBLD CKD-EPI 2021: >90 ML/MIN/{1.73_M2}
EOSINOPHIL # BLD: 0.5 K/MCL (ref 0–0.5)
EOSINOPHIL NFR BLD: 7 %
ERYTHROCYTE [DISTWIDTH] IN BLOOD: 12.2 % (ref 11–15)
FASTING DURATION TIME PATIENT: ABNORMAL H
GLUCOSE SERPL-MCNC: 151 MG/DL (ref 70–99)
HCT VFR BLD CALC: 37.4 % (ref 36–46.5)
HGB BLD-MCNC: 12.7 G/DL (ref 12–15.5)
IMM GRANULOCYTES # BLD AUTO: 0 K/MCL (ref 0–0.2)
IMM GRANULOCYTES # BLD: 0 %
LYMPHOCYTES # BLD: 2.8 K/MCL (ref 1–4.8)
LYMPHOCYTES NFR BLD: 38 %
MCH RBC QN AUTO: 31.1 PG (ref 26–34)
MCHC RBC AUTO-ENTMCNC: 34 G/DL (ref 32–36.5)
MCV RBC AUTO: 91.7 FL (ref 78–100)
MONOCYTES # BLD: 0.5 K/MCL (ref 0.3–0.9)
MONOCYTES NFR BLD: 6 %
NEUTROPHILS # BLD: 3.5 K/MCL (ref 1.8–7.7)
NEUTROPHILS NFR BLD: 48 %
NRBC BLD MANUAL-RTO: 0 /100 WBC
PLATELET # BLD AUTO: 346 K/MCL (ref 140–450)
POTASSIUM SERPL-SCNC: 4.1 MMOL/L (ref 3.4–5.1)
RBC # BLD: 4.08 MIL/MCL (ref 4–5.2)
SODIUM SERPL-SCNC: 137 MMOL/L (ref 135–145)
WBC # BLD: 7.4 K/MCL (ref 4.2–11)

## 2024-08-12 PROCEDURE — 93970 EXTREMITY STUDY: CPT

## 2024-08-12 PROCEDURE — 85025 COMPLETE CBC W/AUTO DIFF WBC: CPT | Performed by: EMERGENCY MEDICINE

## 2024-08-12 PROCEDURE — 36000 PLACE NEEDLE IN VEIN: CPT

## 2024-08-12 PROCEDURE — 99284 EMERGENCY DEPT VISIT MOD MDM: CPT

## 2024-08-12 PROCEDURE — 99285 EMERGENCY DEPT VISIT HI MDM: CPT | Performed by: EMERGENCY MEDICINE

## 2024-08-12 PROCEDURE — 82550 ASSAY OF CK (CPK): CPT | Performed by: EMERGENCY MEDICINE

## 2024-08-12 PROCEDURE — 80048 BASIC METABOLIC PNL TOTAL CA: CPT | Performed by: EMERGENCY MEDICINE

## 2024-08-12 ASSESSMENT — ENCOUNTER SYMPTOMS
DIARRHEA: 0
VOMITING: 0
DIZZINESS: 0
NUMBNESS: 0
WOUND: 0
SINUS PRESSURE: 0
LIGHT-HEADEDNESS: 0
NERVOUS/ANXIOUS: 0
BACK PAIN: 0
COLOR CHANGE: 0
ADENOPATHY: 0
ACTIVITY CHANGE: 0
ABDOMINAL PAIN: 0
APPETITE CHANGE: 0
PHOTOPHOBIA: 0
RHINORRHEA: 0
CONFUSION: 0
SORE THROAT: 0
CHEST TIGHTNESS: 0
NAUSEA: 0
HEADACHES: 0
CHILLS: 0
SHORTNESS OF BREATH: 1
BRUISES/BLEEDS EASILY: 0
FATIGUE: 0
FEVER: 0
WEAKNESS: 0

## 2024-08-12 ASSESSMENT — PAIN SCALES - GENERAL
PAINLEVEL_OUTOF10: 3
PAINLEVEL_OUTOF10: 3

## 2024-08-12 ASSESSMENT — MOVEMENT AND STRENGTH ASSESSMENTS: ALL_EXTREMITIES: EQUAL STRENGTH/TONE/MOVEMENT

## 2024-09-09 ENCOUNTER — TELEPHONE (OUTPATIENT)
Dept: ALLERGY | Age: 29
End: 2024-09-09

## 2024-09-10 ENCOUNTER — V-VISIT (OUTPATIENT)
Dept: ALLERGY | Age: 29
End: 2024-09-10

## 2024-09-10 DIAGNOSIS — L50.1 CHRONIC IDIOPATHIC URTICARIA: Primary | ICD-10-CM

## 2024-09-11 RX ORDER — FEXOFENADINE HCL 180 MG/1
180 TABLET ORAL 2 TIMES DAILY
Qty: 60 TABLET | Refills: 11 | Status: SHIPPED | OUTPATIENT
Start: 2024-09-11

## 2024-10-07 ENCOUNTER — WALK IN (OUTPATIENT)
Age: 29
End: 2024-10-07

## 2024-10-07 VITALS
OXYGEN SATURATION: 97 % | BODY MASS INDEX: 22.43 KG/M2 | HEIGHT: 64 IN | TEMPERATURE: 99.7 F | DIASTOLIC BLOOD PRESSURE: 86 MMHG | HEART RATE: 81 BPM | SYSTOLIC BLOOD PRESSURE: 120 MMHG | RESPIRATION RATE: 18 BRPM | WEIGHT: 131.39 LBS

## 2024-10-07 DIAGNOSIS — R50.9 ELEVATED TEMPERATURE: ICD-10-CM

## 2024-10-07 DIAGNOSIS — J04.0 LARYNGITIS, ACUTE: ICD-10-CM

## 2024-10-07 DIAGNOSIS — H60.513 ACUTE ACTINIC OTITIS EXTERNA, BILATERAL: ICD-10-CM

## 2024-10-07 DIAGNOSIS — H91.93 HEARING DECREASED, BILATERAL: ICD-10-CM

## 2024-10-07 DIAGNOSIS — J06.9 ACUTE URI: Primary | ICD-10-CM

## 2024-10-07 PROCEDURE — 99213 OFFICE O/P EST LOW 20 MIN: CPT | Performed by: FAMILY MEDICINE

## 2024-10-07 RX ORDER — AMOXICILLIN 875 MG
875 TABLET ORAL 2 TIMES DAILY
Qty: 20 TABLET | Refills: 0 | Status: SHIPPED | OUTPATIENT
Start: 2024-10-07 | End: 2024-10-17

## 2024-10-07 RX ORDER — DUPILUMAB 300 MG/2ML
300 INJECTION, SOLUTION SUBCUTANEOUS
COMMUNITY
Start: 2019-12-09

## 2024-10-07 RX ORDER — NEOMYCIN SULFATE, POLYMYXIN B SULFATE AND HYDROCORTISONE 10; 3.5; 1 MG/ML; MG/ML; [USP'U]/ML
3 SUSPENSION/ DROPS AURICULAR (OTIC) 4 TIMES DAILY
Qty: 10 ML | Refills: 0 | Status: SHIPPED | OUTPATIENT
Start: 2024-10-07 | End: 2024-10-14

## 2024-10-22 ENCOUNTER — LAB SERVICES (OUTPATIENT)
Dept: LAB | Age: 29
End: 2024-10-22

## 2024-10-22 ENCOUNTER — APPOINTMENT (OUTPATIENT)
Dept: OBGYN | Age: 29
End: 2024-10-22

## 2024-10-22 VITALS — OXYGEN SATURATION: 99 % | SYSTOLIC BLOOD PRESSURE: 98 MMHG | HEART RATE: 108 BPM | DIASTOLIC BLOOD PRESSURE: 62 MMHG

## 2024-10-22 DIAGNOSIS — Z13.29 SCREENING FOR ENDOCRINE/METABOLIC/IMMUNITY DISORDERS: ICD-10-CM

## 2024-10-22 DIAGNOSIS — N92.0 MENORRHAGIA WITH REGULAR CYCLE: Primary | ICD-10-CM

## 2024-10-22 DIAGNOSIS — N92.6 IRREGULAR MENSES: ICD-10-CM

## 2024-10-22 DIAGNOSIS — N94.6 DYSMENORRHEA: ICD-10-CM

## 2024-10-22 DIAGNOSIS — Z13.228 SCREENING FOR ENDOCRINE/METABOLIC/IMMUNITY DISORDERS: ICD-10-CM

## 2024-10-22 DIAGNOSIS — Z13.0 SCREENING FOR ENDOCRINE/METABOLIC/IMMUNITY DISORDERS: ICD-10-CM

## 2024-10-22 DIAGNOSIS — Z13.29 SCREENING FOR THYROID DISORDER: ICD-10-CM

## 2024-10-22 LAB — TSH SERPL-ACNC: 2.15 MCUNITS/ML (ref 0.35–5)

## 2024-10-22 PROCEDURE — 99204 OFFICE O/P NEW MOD 45 MIN: CPT | Performed by: OBSTETRICS & GYNECOLOGY

## 2024-10-22 PROCEDURE — 82166 ASSAY ANTI-MULLERIAN HORM: CPT | Performed by: CLINICAL MEDICAL LABORATORY

## 2024-10-22 PROCEDURE — 36415 COLL VENOUS BLD VENIPUNCTURE: CPT | Performed by: INTERNAL MEDICINE

## 2024-10-22 PROCEDURE — 84403 ASSAY OF TOTAL TESTOSTERONE: CPT | Performed by: CLINICAL MEDICAL LABORATORY

## 2024-10-22 PROCEDURE — 84443 ASSAY THYROID STIM HORMONE: CPT | Performed by: CLINICAL MEDICAL LABORATORY

## 2024-10-25 LAB — MIS SERPL-MCNC: 8 NG/ML (ref 0.4–16.02)

## 2024-10-26 LAB — TESTOST SERPL-MCNC: 25 NG/DL (ref 9–55)

## 2024-10-27 ENCOUNTER — WALK IN (OUTPATIENT)
Age: 29
End: 2024-10-27

## 2024-10-27 VITALS
SYSTOLIC BLOOD PRESSURE: 111 MMHG | DIASTOLIC BLOOD PRESSURE: 78 MMHG | RESPIRATION RATE: 16 BRPM | HEART RATE: 73 BPM | HEIGHT: 64 IN | OXYGEN SATURATION: 100 % | TEMPERATURE: 98 F | BODY MASS INDEX: 22.36 KG/M2 | WEIGHT: 131 LBS

## 2024-10-27 DIAGNOSIS — R09.82 POST-NASAL DRIP: ICD-10-CM

## 2024-10-27 DIAGNOSIS — H92.03 EARACHE SYMPTOMS, BILATERAL: Primary | ICD-10-CM

## 2024-10-27 DIAGNOSIS — R52 BODY ACHES: ICD-10-CM

## 2024-10-27 PROCEDURE — 99213 OFFICE O/P EST LOW 20 MIN: CPT | Performed by: PHYSICIAN ASSISTANT

## 2024-10-27 RX ORDER — FEXOFENADINE HCL 180 MG/1
180 TABLET ORAL DAILY
Qty: 30 TABLET | Refills: 0 | Status: SHIPPED | OUTPATIENT
Start: 2024-10-27 | End: 2024-11-26

## 2024-10-27 RX ORDER — TRIAMCINOLONE ACETONIDE 55 UG/1
2 SPRAY, METERED NASAL DAILY
Qty: 16.5 ML | Refills: 12 | Status: SHIPPED | OUTPATIENT
Start: 2024-10-27

## 2024-10-27 ASSESSMENT — ENCOUNTER SYMPTOMS
FATIGUE: 1
PSYCHIATRIC NEGATIVE: 1
SINUS PRESSURE: 1
TROUBLE SWALLOWING: 0
RESPIRATORY NEGATIVE: 1
NEUROLOGICAL NEGATIVE: 1
EYES NEGATIVE: 1
GASTROINTESTINAL NEGATIVE: 1
CHILLS: 0
FEVER: 0
UNEXPECTED WEIGHT CHANGE: 0
VOICE CHANGE: 0
HEMATOLOGIC/LYMPHATIC NEGATIVE: 1

## 2024-10-28 ENCOUNTER — TELEPHONE (OUTPATIENT)
Dept: OBGYN | Age: 29
End: 2024-10-28

## 2024-10-30 ENCOUNTER — E-ADVICE (OUTPATIENT)
Dept: OBGYN | Age: 29
End: 2024-10-30

## 2024-11-13 ENCOUNTER — LAB SERVICES (OUTPATIENT)
Dept: LAB | Age: 29
End: 2024-11-13

## 2024-11-13 DIAGNOSIS — N92.6 IRREGULAR MENSES: ICD-10-CM

## 2024-11-13 PROCEDURE — 36415 COLL VENOUS BLD VENIPUNCTURE: CPT | Performed by: INTERNAL MEDICINE

## 2024-11-13 PROCEDURE — 84144 ASSAY OF PROGESTERONE: CPT | Performed by: CLINICAL MEDICAL LABORATORY

## 2024-11-14 ENCOUNTER — E-ADVICE (OUTPATIENT)
Dept: OBGYN | Age: 29
End: 2024-11-14

## 2024-11-14 LAB — PROGEST SERPL-MCNC: 4.36 NG/ML

## 2024-11-18 RX ORDER — PROGESTERONE 200 MG/1
CAPSULE ORAL
Qty: 30 CAPSULE | Refills: 0 | Status: CANCELLED | OUTPATIENT
Start: 2024-11-18

## 2025-01-03 ENCOUNTER — HOSPITAL ENCOUNTER (EMERGENCY)
Age: 30
Discharge: HOME | End: 2025-01-03
Attending: EMERGENCY MEDICINE

## 2025-01-03 ENCOUNTER — APPOINTMENT (OUTPATIENT)
Dept: CT IMAGING | Age: 30
End: 2025-01-03
Attending: EMERGENCY MEDICINE

## 2025-01-03 VITALS
BODY MASS INDEX: 23.34 KG/M2 | OXYGEN SATURATION: 98 % | RESPIRATION RATE: 16 BRPM | SYSTOLIC BLOOD PRESSURE: 91 MMHG | HEIGHT: 64 IN | HEART RATE: 72 BPM | DIASTOLIC BLOOD PRESSURE: 64 MMHG | WEIGHT: 136.69 LBS | TEMPERATURE: 98.3 F

## 2025-01-03 DIAGNOSIS — N99.820 POSTOPERATIVE VAGINAL BLEEDING FOLLOWING GENITOURINARY PROCEDURE: Primary | ICD-10-CM

## 2025-01-03 LAB
ALBUMIN SERPL-MCNC: 3.8 G/DL (ref 3.4–5)
ALBUMIN/GLOB SERPL: 1.2 {RATIO} (ref 1–2.4)
ALP SERPL-CCNC: 67 UNITS/L (ref 45–117)
ALT SERPL-CCNC: 20 UNITS/L
ANION GAP SERPL CALC-SCNC: 12 MMOL/L (ref 7–19)
APPEARANCE UR: CLEAR
AST SERPL-CCNC: 14 UNITS/L
BACTERIA #/AREA URNS HPF: ABNORMAL /HPF
BASOPHILS # BLD: 0 K/MCL (ref 0–0.3)
BASOPHILS NFR BLD: 0 %
BILIRUB SERPL-MCNC: 0.2 MG/DL (ref 0.2–1)
BILIRUB UR QL STRIP: NEGATIVE
BUN SERPL-MCNC: 14 MG/DL (ref 6–20)
BUN/CREAT SERPL: 23 (ref 7–25)
CALCIUM SERPL-MCNC: 8.9 MG/DL (ref 8.4–10.2)
CHLORIDE SERPL-SCNC: 106 MMOL/L (ref 97–110)
CO2 SERPL-SCNC: 26 MMOL/L (ref 21–32)
COLOR UR: ABNORMAL
CREAT SERPL-MCNC: 0.61 MG/DL (ref 0.51–0.95)
DEPRECATED RDW RBC: 40.9 FL (ref 39–50)
EGFRCR SERPLBLD CKD-EPI 2021: >90 ML/MIN/{1.73_M2}
EOSINOPHIL # BLD: 0.3 K/MCL (ref 0–0.5)
EOSINOPHIL NFR BLD: 4 %
ERYTHROCYTE [DISTWIDTH] IN BLOOD: 12.3 % (ref 11–15)
FASTING DURATION TIME PATIENT: NORMAL H
GLOBULIN SER-MCNC: 3.1 G/DL (ref 2–4)
GLUCOSE SERPL-MCNC: 96 MG/DL (ref 70–99)
GLUCOSE UR STRIP-MCNC: NEGATIVE MG/DL
HCT VFR BLD CALC: 35.9 % (ref 36–46.5)
HGB BLD-MCNC: 12.3 G/DL (ref 12–15.5)
HGB UR QL STRIP: ABNORMAL
HYALINE CASTS #/AREA URNS LPF: ABNORMAL /LPF
IMM GRANULOCYTES # BLD AUTO: 0 K/MCL (ref 0–0.2)
IMM GRANULOCYTES # BLD: 0 %
KETONES UR STRIP-MCNC: 15 MG/DL
LEUKOCYTE ESTERASE UR QL STRIP: NEGATIVE
LYMPHOCYTES # BLD: 2.3 K/MCL (ref 1–4.8)
LYMPHOCYTES NFR BLD: 30 %
MCH RBC QN AUTO: 30.8 PG (ref 26–34)
MCHC RBC AUTO-ENTMCNC: 34.3 G/DL (ref 32–36.5)
MCV RBC AUTO: 90 FL (ref 78–100)
MONOCYTES # BLD: 0.6 K/MCL (ref 0.3–0.9)
MONOCYTES NFR BLD: 8 %
MUCOUS THREADS URNS QL MICRO: PRESENT
NEUTROPHILS # BLD: 4.4 K/MCL (ref 1.8–7.7)
NEUTROPHILS NFR BLD: 58 %
NITRITE UR QL STRIP: NEGATIVE
NRBC BLD MANUAL-RTO: 0 /100 WBC
PH UR STRIP: 6.5 [PH] (ref 5–7)
PLATELET # BLD AUTO: 306 K/MCL (ref 140–450)
POTASSIUM SERPL-SCNC: 3.6 MMOL/L (ref 3.4–5.1)
PROT SERPL-MCNC: 6.9 G/DL (ref 6.4–8.2)
PROT UR STRIP-MCNC: NEGATIVE MG/DL
RAINBOW EXTRA TUBES HOLD SPECIMEN: NORMAL
RBC # BLD: 3.99 MIL/MCL (ref 4–5.2)
RBC #/AREA URNS HPF: ABNORMAL /HPF
SODIUM SERPL-SCNC: 140 MMOL/L (ref 135–145)
SP GR UR STRIP: 1.01 (ref 1–1.03)
SQUAMOUS #/AREA URNS HPF: ABNORMAL /HPF
UROBILINOGEN UR STRIP-MCNC: 0.2 MG/DL
WBC # BLD: 7.7 K/MCL (ref 4.2–11)
WBC #/AREA URNS HPF: ABNORMAL /HPF

## 2025-01-03 PROCEDURE — 85025 COMPLETE CBC W/AUTO DIFF WBC: CPT | Performed by: EMERGENCY MEDICINE

## 2025-01-03 PROCEDURE — 10004651 HB RX, NO CHARGE ITEM: Performed by: RADIOLOGY

## 2025-01-03 PROCEDURE — 74177 CT ABD & PELVIS W/CONTRAST: CPT

## 2025-01-03 PROCEDURE — 81001 URINALYSIS AUTO W/SCOPE: CPT | Performed by: EMERGENCY MEDICINE

## 2025-01-03 PROCEDURE — 74177 CT ABD & PELVIS W/CONTRAST: CPT | Performed by: RADIOLOGY

## 2025-01-03 PROCEDURE — 99284 EMERGENCY DEPT VISIT MOD MDM: CPT

## 2025-01-03 PROCEDURE — 80053 COMPREHEN METABOLIC PANEL: CPT | Performed by: EMERGENCY MEDICINE

## 2025-01-03 PROCEDURE — 10002807 HB RX 258: Performed by: RADIOLOGY

## 2025-01-03 PROCEDURE — 10002805 HB CONTRAST AGENT: Performed by: RADIOLOGY

## 2025-01-03 RX ORDER — 0.9 % SODIUM CHLORIDE 0.9 %
10 VIAL (ML) INJECTION ONCE
Status: COMPLETED | OUTPATIENT
Start: 2025-01-03 | End: 2025-01-03

## 2025-01-03 RX ADMIN — SODIUM CHLORIDE 100 ML: 9 INJECTION, SOLUTION INTRAVENOUS at 05:18

## 2025-01-03 RX ADMIN — IOHEXOL 100 ML: 300 INJECTION, SOLUTION INTRAVENOUS at 05:18

## 2025-01-03 RX ADMIN — SODIUM CHLORIDE 10 ML: 9 INJECTION, SOLUTION INTRAMUSCULAR; INTRAVENOUS; SUBCUTANEOUS at 05:18

## 2025-01-03 ASSESSMENT — PAIN DESCRIPTION - PAIN TYPE: TYPE: ACUTE PAIN

## 2025-01-03 ASSESSMENT — PAIN SCALES - GENERAL: PAINLEVEL_OUTOF10: 6

## 2025-01-21 ENCOUNTER — APPOINTMENT (OUTPATIENT)
Dept: FAMILY MEDICINE | Age: 30
End: 2025-01-21

## 2025-01-21 VITALS
WEIGHT: 131.84 LBS | DIASTOLIC BLOOD PRESSURE: 58 MMHG | HEART RATE: 80 BPM | SYSTOLIC BLOOD PRESSURE: 92 MMHG | OXYGEN SATURATION: 100 % | HEIGHT: 64 IN | BODY MASS INDEX: 22.51 KG/M2

## 2025-01-21 DIAGNOSIS — E07.9 THYROID CONDITION: ICD-10-CM

## 2025-01-21 DIAGNOSIS — Z76.89 ENCOUNTER TO ESTABLISH CARE: Primary | ICD-10-CM

## 2025-01-21 DIAGNOSIS — J45.20 MILD INTERMITTENT ASTHMA WITHOUT COMPLICATION (CMD): ICD-10-CM

## 2025-01-21 DIAGNOSIS — N80.9 ENDOMETRIOSIS: ICD-10-CM

## 2025-01-21 DIAGNOSIS — E28.2 PCOS (POLYCYSTIC OVARIAN SYNDROME): ICD-10-CM

## 2025-01-21 DIAGNOSIS — L20.89 OTHER ATOPIC DERMATITIS: ICD-10-CM

## 2025-01-21 PROBLEM — L20.9 ATOPIC DERMATITIS: Status: ACTIVE | Noted: 2017-06-20

## 2025-01-21 PROCEDURE — 99204 OFFICE O/P NEW MOD 45 MIN: CPT

## 2025-01-21 RX ORDER — LEVOTHYROXINE SODIUM 25 UG/1
25 TABLET ORAL
COMMUNITY
Start: 2025-01-14

## 2025-01-21 RX ORDER — METFORMIN HYDROCHLORIDE 500 MG/1
500 TABLET, EXTENDED RELEASE ORAL
COMMUNITY
Start: 2024-12-12

## 2025-01-21 ASSESSMENT — PATIENT HEALTH QUESTIONNAIRE - PHQ9
SUM OF ALL RESPONSES TO PHQ9 QUESTIONS 1 AND 2: 0
CLINICAL INTERPRETATION OF PHQ2 SCORE: NO FURTHER SCREENING NEEDED
2. FEELING DOWN, DEPRESSED OR HOPELESS: NOT AT ALL
1. LITTLE INTEREST OR PLEASURE IN DOING THINGS: NOT AT ALL
SUM OF ALL RESPONSES TO PHQ9 QUESTIONS 1 AND 2: 0

## 2025-02-12 ENCOUNTER — APPOINTMENT (OUTPATIENT)
Dept: ALLERGY | Age: 30
End: 2025-02-12

## 2025-02-18 ENCOUNTER — TELEPHONE (OUTPATIENT)
Dept: FAMILY MEDICINE | Age: 30
End: 2025-02-18

## 2025-02-18 DIAGNOSIS — J45.20 MILD INTERMITTENT ASTHMA WITHOUT COMPLICATION (CMD): ICD-10-CM

## 2025-03-03 ENCOUNTER — APPOINTMENT (OUTPATIENT)
Dept: ENDOCRINOLOGY | Age: 30
End: 2025-03-03

## 2025-03-20 ENCOUNTER — TELEPHONE (OUTPATIENT)
Age: 30
End: 2025-03-20

## 2025-03-20 ENCOUNTER — WALK IN (OUTPATIENT)
Age: 30
End: 2025-03-20

## 2025-03-20 VITALS
DIASTOLIC BLOOD PRESSURE: 68 MMHG | WEIGHT: 126 LBS | RESPIRATION RATE: 16 BRPM | TEMPERATURE: 98.5 F | HEART RATE: 95 BPM | SYSTOLIC BLOOD PRESSURE: 104 MMHG | OXYGEN SATURATION: 99 % | BODY MASS INDEX: 21.51 KG/M2

## 2025-03-20 DIAGNOSIS — R10.9 ABDOMINAL PAIN, UNSPECIFIED ABDOMINAL LOCATION: ICD-10-CM

## 2025-03-20 DIAGNOSIS — R23.8 SKIN IRRITATION: Primary | ICD-10-CM

## 2025-03-20 DIAGNOSIS — T78.40XA ALLERGIC REACTION, INITIAL ENCOUNTER: ICD-10-CM

## 2025-03-20 PROCEDURE — 99214 OFFICE O/P EST MOD 30 MIN: CPT | Performed by: FAMILY MEDICINE

## 2025-03-20 RX ORDER — CYCLOBENZAPRINE HCL 5 MG
5 TABLET ORAL EVERY 8 HOURS PRN
COMMUNITY
Start: 2025-03-10

## 2025-03-20 RX ORDER — BUDESONIDE AND FORMOTEROL FUMARATE DIHYDRATE 160; 4.5 UG/1; UG/1
2 AEROSOL RESPIRATORY (INHALATION) 2 TIMES DAILY
COMMUNITY

## 2025-03-20 RX ORDER — KETOROLAC TROMETHAMINE 10 MG/1
TABLET, FILM COATED ORAL
COMMUNITY
Start: 2025-02-18

## 2025-03-20 RX ORDER — HYDROCORTISONE 25 MG/G
OINTMENT TOPICAL
COMMUNITY

## 2025-03-20 RX ORDER — GABAPENTIN 300 MG/1
CAPSULE ORAL
COMMUNITY
Start: 2025-02-18

## 2025-03-20 RX ORDER — FLUTICASONE PROPIONATE 220 UG/1
2 AEROSOL, METERED RESPIRATORY (INHALATION)
COMMUNITY
Start: 2024-05-30 | End: 2025-05-30

## 2025-03-20 RX ORDER — GABAPENTIN 300 MG/1
CAPSULE ORAL
Qty: 30 CAPSULE | Refills: 0 | Status: SHIPPED | OUTPATIENT
Start: 2025-03-20

## 2025-03-28 ENCOUNTER — LAB REQUISITION (OUTPATIENT)
Dept: LAB | Age: 30
End: 2025-03-28

## 2025-03-28 ENCOUNTER — TELEPHONE (OUTPATIENT)
Dept: ALLERGY | Age: 30
End: 2025-03-28

## 2025-03-28 DIAGNOSIS — L20.89 OTHER ATOPIC DERMATITIS: ICD-10-CM

## 2025-03-28 DIAGNOSIS — L08.89 OTHER SPECIFIED LOCAL INFECTIONS OF THE SKIN AND SUBCUTANEOUS TISSUE: ICD-10-CM

## 2025-03-28 PROCEDURE — 87205 SMEAR GRAM STAIN: CPT | Performed by: CLINICAL MEDICAL LABORATORY

## 2025-03-28 PROCEDURE — 87075 CULTR BACTERIA EXCEPT BLOOD: CPT | Performed by: CLINICAL MEDICAL LABORATORY

## 2025-03-28 PROCEDURE — 87077 CULTURE AEROBIC IDENTIFY: CPT | Performed by: CLINICAL MEDICAL LABORATORY

## 2025-03-28 PROCEDURE — 87070 CULTURE OTHR SPECIMN AEROBIC: CPT | Performed by: CLINICAL MEDICAL LABORATORY

## 2025-03-28 PROCEDURE — 87186 SC STD MICRODIL/AGAR DIL: CPT | Performed by: CLINICAL MEDICAL LABORATORY

## 2025-04-02 LAB
BACTERIA SPEC ANAEROBE+AEROBE CULT: ABNORMAL
GRAM STN SPEC: ABNORMAL

## 2025-04-24 ENCOUNTER — APPOINTMENT (OUTPATIENT)
Dept: ENDOCRINOLOGY | Age: 30
End: 2025-04-24

## 2025-04-24 VITALS
HEART RATE: 76 BPM | BODY MASS INDEX: 21.8 KG/M2 | DIASTOLIC BLOOD PRESSURE: 60 MMHG | WEIGHT: 127.7 LBS | SYSTOLIC BLOOD PRESSURE: 92 MMHG | OXYGEN SATURATION: 96 %

## 2025-04-24 DIAGNOSIS — E28.2 PCOS (POLYCYSTIC OVARIAN SYNDROME): ICD-10-CM

## 2025-04-24 DIAGNOSIS — Z13.29 SCREENING FOR THYROID DISORDER: Primary | ICD-10-CM

## 2025-04-24 RX ORDER — ROFLUMILAST 3 MG/G
CREAM TOPICAL
COMMUNITY
Start: 2025-04-01

## 2025-04-24 RX ORDER — DEXAMETHASONE 1 MG
1 TABLET ORAL ONCE
Qty: 1 TABLET | Refills: 0 | Status: SHIPPED | OUTPATIENT
Start: 2025-04-24 | End: 2025-04-24

## 2025-04-28 ENCOUNTER — TELEPHONE (OUTPATIENT)
Dept: FAMILY MEDICINE | Age: 30
End: 2025-04-28

## 2025-05-05 ENCOUNTER — TELEPHONE (OUTPATIENT)
Dept: FAMILY PLANNING/WOMEN'S HEALTH CLINIC | Age: 30
End: 2025-05-05

## 2025-05-13 ENCOUNTER — LAB SERVICES (OUTPATIENT)
Dept: LAB | Age: 30
End: 2025-05-13

## 2025-05-13 DIAGNOSIS — Z13.29 SCREENING FOR THYROID DISORDER: ICD-10-CM

## 2025-05-13 DIAGNOSIS — E28.2 PCOS (POLYCYSTIC OVARIAN SYNDROME): ICD-10-CM

## 2025-05-13 LAB — CORTIS SERPL-MCNC: 0.9 MCG/DL (ref 3.4–22.5)

## 2025-05-13 PROCEDURE — 80299 QUANTITATIVE ASSAY DRUG: CPT | Performed by: CLINICAL MEDICAL LABORATORY

## 2025-05-13 PROCEDURE — 82024 ASSAY OF ACTH: CPT | Performed by: CLINICAL MEDICAL LABORATORY

## 2025-05-13 PROCEDURE — 36415 COLL VENOUS BLD VENIPUNCTURE: CPT | Performed by: INTERNAL MEDICINE

## 2025-05-13 PROCEDURE — 82533 TOTAL CORTISOL: CPT | Performed by: CLINICAL MEDICAL LABORATORY

## 2025-05-14 ENCOUNTER — RESULTS FOLLOW-UP (OUTPATIENT)
Dept: ENDOCRINOLOGY | Age: 30
End: 2025-05-14

## 2025-05-14 LAB — ACTH PLAS-MCNC: 2.4 PG/ML (ref 7.2–63)

## 2025-05-17 LAB — DEXAMETHASONE SERPL-MCNC: 637.3 NG/DL

## 2025-05-22 ENCOUNTER — HOSPITAL ENCOUNTER (OUTPATIENT)
Dept: FAMILY PLANNING/WOMEN'S HEALTH CLINIC | Age: 30
Discharge: HOME | End: 2025-05-22

## 2025-05-22 VITALS
SYSTOLIC BLOOD PRESSURE: 92 MMHG | HEART RATE: 87 BPM | RESPIRATION RATE: 16 BRPM | WEIGHT: 124.9 LBS | BODY MASS INDEX: 21.32 KG/M2 | HEIGHT: 64 IN | DIASTOLIC BLOOD PRESSURE: 60 MMHG

## 2025-05-22 DIAGNOSIS — E03.9 HYPOTHYROIDISM, UNSPECIFIED TYPE: Primary | ICD-10-CM

## 2025-05-22 DIAGNOSIS — F32.81 PMDD (PREMENSTRUAL DYSPHORIC DISORDER): ICD-10-CM

## 2025-05-22 DIAGNOSIS — N94.6 DYSMENORRHEA: ICD-10-CM

## 2025-05-22 LAB — HBA1C MFR BLD: 5 % (ref 4.5–5.6)

## 2025-05-22 PROCEDURE — 83037 HB GLYCOSYLATED A1C HOME DEV: CPT

## 2025-05-22 PROCEDURE — 10004630 HB COUNTER-VISIT BY NP

## 2025-05-22 PROCEDURE — 99202 OFFICE O/P NEW SF 15 MIN: CPT

## 2025-05-22 PROCEDURE — 99205 OFFICE O/P NEW HI 60 MIN: CPT

## 2025-09-04 ENCOUNTER — HOSPITAL ENCOUNTER (OUTPATIENT)
Dept: FAMILY PLANNING/WOMEN'S HEALTH CLINIC | Age: 30
Discharge: HOME | End: 2025-09-04
Attending: OBSTETRICS & GYNECOLOGY

## 2025-09-04 VITALS
RESPIRATION RATE: 16 BRPM | BODY MASS INDEX: 20.22 KG/M2 | HEART RATE: 95 BPM | WEIGHT: 118.4 LBS | SYSTOLIC BLOOD PRESSURE: 108 MMHG | DIASTOLIC BLOOD PRESSURE: 70 MMHG | HEIGHT: 64 IN

## 2025-09-04 DIAGNOSIS — Z11.3 SCREENING EXAMINATION FOR STD (SEXUALLY TRANSMITTED DISEASE): Primary | ICD-10-CM

## 2025-09-04 LAB
HBV SURFACE AG SER QL: NEGATIVE
HCV AB SER QL: NEGATIVE
HIV 1+2 AB+HIV1 P24 AG SERPL QL IA: NONREACTIVE

## 2025-09-04 PROCEDURE — 86803 HEPATITIS C AB TEST: CPT | Performed by: OBSTETRICS & GYNECOLOGY

## 2025-09-04 PROCEDURE — 36415 COLL VENOUS BLD VENIPUNCTURE: CPT

## 2025-09-04 PROCEDURE — 87389 HIV-1 AG W/HIV-1&-2 AB AG IA: CPT | Performed by: OBSTETRICS & GYNECOLOGY

## 2025-09-04 PROCEDURE — 87661 TRICHOMONAS VAGINALIS AMPLIF: CPT | Performed by: OBSTETRICS & GYNECOLOGY

## 2025-09-04 PROCEDURE — 87340 HEPATITIS B SURFACE AG IA: CPT | Performed by: OBSTETRICS & GYNECOLOGY

## 2025-09-04 PROCEDURE — 99212 OFFICE O/P EST SF 10 MIN: CPT

## 2025-09-04 PROCEDURE — 87491 CHLMYD TRACH DNA AMP PROBE: CPT | Performed by: OBSTETRICS & GYNECOLOGY

## 2025-09-04 PROCEDURE — 10004634 HB COUNTER-VISIT MD

## 2025-09-04 PROCEDURE — 86592 SYPHILIS TEST NON-TREP QUAL: CPT | Performed by: OBSTETRICS & GYNECOLOGY

## 2025-09-04 SDOH — HEALTH STABILITY: MENTAL HEALTH: HOW MANY STANDARD DRINKS CONTAINING ALCOHOL DO YOU HAVE ON A TYPICAL DAY?: 0,1 OR 2

## 2025-09-04 SDOH — HEALTH STABILITY: MENTAL HEALTH: HOW OFTEN DO YOU HAVE A DRINK CONTAINING ALCOHOL?: 2 TO 3 TIMES PER WEEK

## 2025-09-05 LAB
C TRACH RRNA SPEC QL NAA+PROBE: NEGATIVE
N GONORRHOEA RRNA SPEC QL NAA+PROBE: NEGATIVE
RPR SER QL: NONREACTIVE
SERVICE CMNT-IMP: NORMAL
T VAGINALIS RRNA SPEC QL NAA+PROBE: NEGATIVE

## 2025-11-26 ENCOUNTER — APPOINTMENT (OUTPATIENT)
Dept: FAMILY MEDICINE | Age: 30
End: 2025-11-26